# Patient Record
Sex: MALE | Race: WHITE | NOT HISPANIC OR LATINO | Employment: OTHER | URBAN - METROPOLITAN AREA
[De-identification: names, ages, dates, MRNs, and addresses within clinical notes are randomized per-mention and may not be internally consistent; named-entity substitution may affect disease eponyms.]

---

## 2017-05-24 ENCOUNTER — ALLSCRIPTS OFFICE VISIT (OUTPATIENT)
Dept: WOUND CARE | Facility: HOSPITAL | Age: 62
End: 2017-05-24
Payer: COMMERCIAL

## 2017-05-24 DIAGNOSIS — I10 ESSENTIAL (PRIMARY) HYPERTENSION: ICD-10-CM

## 2017-05-24 PROCEDURE — 99213 OFFICE O/P EST LOW 20 MIN: CPT | Performed by: PREVENTIVE MEDICINE

## 2017-06-08 ENCOUNTER — HOSPITAL ENCOUNTER (OUTPATIENT)
Dept: NON INVASIVE DIAGNOSTICS | Facility: HOSPITAL | Age: 62
Discharge: HOME | End: 2017-06-08
Attending: PREVENTIVE MEDICINE
Payer: COMMERCIAL

## 2017-06-08 ENCOUNTER — TRANSCRIBE ORDERS (OUTPATIENT)
Dept: ADMINISTRATIVE | Facility: HOSPITAL | Age: 62
End: 2017-06-08

## 2017-06-08 DIAGNOSIS — I10 ESSENTIAL HYPERTENSION, MALIGNANT: Primary | ICD-10-CM

## 2017-06-08 DIAGNOSIS — I10 ESSENTIAL HYPERTENSION, MALIGNANT: ICD-10-CM

## 2017-06-08 LAB
ATRIAL RATE: 62 BPM
P AXIS: 25 DEGREES
PR INTERVAL: 184 MS
QRS AXIS: 4 DEGREES
QRSD INTERVAL: 108 MS
QT INTERVAL: 400 MS
QTC INTERVAL: 406 MS
T WAVE AXIS: 18 DEGREES
VENTRICULAR RATE: 62 BPM

## 2017-06-08 PROCEDURE — 93005 ELECTROCARDIOGRAM TRACING: CPT

## 2017-06-15 ENCOUNTER — APPOINTMENT (OUTPATIENT)
Dept: WOUND CARE | Facility: HOSPITAL | Age: 62
End: 2017-06-15
Payer: COMMERCIAL

## 2017-06-15 PROCEDURE — G0277 HBOT, FULL BODY CHAMBER, 30M: HCPCS | Performed by: NURSE PRACTITIONER

## 2017-06-16 ENCOUNTER — APPOINTMENT (OUTPATIENT)
Dept: WOUND CARE | Facility: HOSPITAL | Age: 62
End: 2017-06-16
Payer: COMMERCIAL

## 2017-06-16 PROCEDURE — G0277 HBOT, FULL BODY CHAMBER, 30M: HCPCS | Performed by: NURSE PRACTITIONER

## 2017-06-19 ENCOUNTER — APPOINTMENT (OUTPATIENT)
Dept: WOUND CARE | Facility: HOSPITAL | Age: 62
End: 2017-06-19
Payer: COMMERCIAL

## 2017-06-19 PROCEDURE — G0277 HBOT, FULL BODY CHAMBER, 30M: HCPCS | Performed by: SURGERY

## 2017-06-20 ENCOUNTER — APPOINTMENT (OUTPATIENT)
Dept: WOUND CARE | Facility: HOSPITAL | Age: 62
End: 2017-06-20
Payer: COMMERCIAL

## 2017-06-20 PROCEDURE — G0277 HBOT, FULL BODY CHAMBER, 30M: HCPCS | Performed by: NURSE PRACTITIONER

## 2017-06-21 ENCOUNTER — APPOINTMENT (OUTPATIENT)
Dept: WOUND CARE | Facility: HOSPITAL | Age: 62
End: 2017-06-21
Payer: COMMERCIAL

## 2017-06-21 PROCEDURE — G0277 HBOT, FULL BODY CHAMBER, 30M: HCPCS | Performed by: PREVENTIVE MEDICINE

## 2017-06-22 ENCOUNTER — APPOINTMENT (OUTPATIENT)
Dept: WOUND CARE | Facility: HOSPITAL | Age: 62
End: 2017-06-22
Payer: COMMERCIAL

## 2017-06-22 PROCEDURE — G0277 HBOT, FULL BODY CHAMBER, 30M: HCPCS | Performed by: NURSE PRACTITIONER

## 2017-06-23 ENCOUNTER — APPOINTMENT (OUTPATIENT)
Dept: WOUND CARE | Facility: HOSPITAL | Age: 62
End: 2017-06-23
Payer: COMMERCIAL

## 2017-06-23 PROCEDURE — G0277 HBOT, FULL BODY CHAMBER, 30M: HCPCS

## 2017-06-26 ENCOUNTER — APPOINTMENT (OUTPATIENT)
Dept: WOUND CARE | Facility: HOSPITAL | Age: 62
End: 2017-06-26
Payer: COMMERCIAL

## 2017-06-26 PROCEDURE — G0277 HBOT, FULL BODY CHAMBER, 30M: HCPCS | Performed by: SURGERY

## 2017-06-27 ENCOUNTER — APPOINTMENT (OUTPATIENT)
Dept: WOUND CARE | Facility: HOSPITAL | Age: 62
End: 2017-06-27
Payer: COMMERCIAL

## 2017-06-27 PROCEDURE — G0277 HBOT, FULL BODY CHAMBER, 30M: HCPCS | Performed by: NURSE PRACTITIONER

## 2017-06-28 ENCOUNTER — APPOINTMENT (OUTPATIENT)
Dept: WOUND CARE | Facility: HOSPITAL | Age: 62
End: 2017-06-28
Payer: COMMERCIAL

## 2017-06-28 PROCEDURE — G0277 HBOT, FULL BODY CHAMBER, 30M: HCPCS | Performed by: PREVENTIVE MEDICINE

## 2017-06-29 ENCOUNTER — APPOINTMENT (OUTPATIENT)
Dept: WOUND CARE | Facility: HOSPITAL | Age: 62
End: 2017-06-29
Payer: COMMERCIAL

## 2017-06-29 PROCEDURE — G0277 HBOT, FULL BODY CHAMBER, 30M: HCPCS | Performed by: PREVENTIVE MEDICINE

## 2017-06-30 ENCOUNTER — APPOINTMENT (OUTPATIENT)
Dept: WOUND CARE | Facility: HOSPITAL | Age: 62
End: 2017-06-30
Payer: COMMERCIAL

## 2017-06-30 PROCEDURE — G0277 HBOT, FULL BODY CHAMBER, 30M: HCPCS | Performed by: SURGERY

## 2017-07-03 ENCOUNTER — APPOINTMENT (OUTPATIENT)
Dept: WOUND CARE | Facility: HOSPITAL | Age: 62
End: 2017-07-03
Payer: COMMERCIAL

## 2017-07-03 PROCEDURE — G0277 HBOT, FULL BODY CHAMBER, 30M: HCPCS | Performed by: SURGERY

## 2017-07-05 ENCOUNTER — APPOINTMENT (OUTPATIENT)
Dept: WOUND CARE | Facility: HOSPITAL | Age: 62
End: 2017-07-05
Payer: COMMERCIAL

## 2017-07-05 PROCEDURE — G0277 HBOT, FULL BODY CHAMBER, 30M: HCPCS

## 2017-07-06 ENCOUNTER — APPOINTMENT (OUTPATIENT)
Dept: WOUND CARE | Facility: HOSPITAL | Age: 62
End: 2017-07-06
Payer: COMMERCIAL

## 2017-07-06 PROCEDURE — G0277 HBOT, FULL BODY CHAMBER, 30M: HCPCS

## 2017-07-11 ENCOUNTER — APPOINTMENT (OUTPATIENT)
Dept: WOUND CARE | Facility: HOSPITAL | Age: 62
End: 2017-07-11
Payer: COMMERCIAL

## 2017-07-11 PROCEDURE — G0277 HBOT, FULL BODY CHAMBER, 30M: HCPCS

## 2017-07-12 ENCOUNTER — APPOINTMENT (OUTPATIENT)
Dept: WOUND CARE | Facility: HOSPITAL | Age: 62
End: 2017-07-12
Payer: COMMERCIAL

## 2017-07-12 PROCEDURE — G0277 HBOT, FULL BODY CHAMBER, 30M: HCPCS

## 2017-07-13 ENCOUNTER — APPOINTMENT (OUTPATIENT)
Dept: WOUND CARE | Facility: HOSPITAL | Age: 62
End: 2017-07-13
Payer: COMMERCIAL

## 2017-07-13 PROCEDURE — G0277 HBOT, FULL BODY CHAMBER, 30M: HCPCS

## 2017-07-14 ENCOUNTER — APPOINTMENT (OUTPATIENT)
Dept: WOUND CARE | Facility: HOSPITAL | Age: 62
End: 2017-07-14
Payer: COMMERCIAL

## 2017-07-14 PROCEDURE — G0277 HBOT, FULL BODY CHAMBER, 30M: HCPCS | Performed by: SURGERY

## 2017-07-17 ENCOUNTER — APPOINTMENT (OUTPATIENT)
Dept: WOUND CARE | Facility: HOSPITAL | Age: 62
End: 2017-07-17
Payer: COMMERCIAL

## 2017-07-17 PROCEDURE — G0277 HBOT, FULL BODY CHAMBER, 30M: HCPCS | Performed by: SURGERY

## 2017-07-24 ENCOUNTER — APPOINTMENT (OUTPATIENT)
Dept: WOUND CARE | Facility: HOSPITAL | Age: 62
End: 2017-07-24
Payer: COMMERCIAL

## 2017-07-24 PROCEDURE — G0277 HBOT, FULL BODY CHAMBER, 30M: HCPCS | Performed by: SURGERY

## 2017-07-25 ENCOUNTER — APPOINTMENT (OUTPATIENT)
Dept: WOUND CARE | Facility: HOSPITAL | Age: 62
End: 2017-07-25
Payer: COMMERCIAL

## 2017-07-25 PROCEDURE — G0277 HBOT, FULL BODY CHAMBER, 30M: HCPCS | Performed by: PREVENTIVE MEDICINE

## 2017-07-26 ENCOUNTER — APPOINTMENT (OUTPATIENT)
Dept: WOUND CARE | Facility: HOSPITAL | Age: 62
End: 2017-07-26
Payer: COMMERCIAL

## 2017-07-26 PROCEDURE — G0277 HBOT, FULL BODY CHAMBER, 30M: HCPCS | Performed by: PREVENTIVE MEDICINE

## 2017-07-28 ENCOUNTER — APPOINTMENT (OUTPATIENT)
Dept: WOUND CARE | Facility: HOSPITAL | Age: 62
End: 2017-07-28
Payer: COMMERCIAL

## 2017-07-28 PROCEDURE — G0277 HBOT, FULL BODY CHAMBER, 30M: HCPCS | Performed by: SURGERY

## 2017-07-31 ENCOUNTER — APPOINTMENT (OUTPATIENT)
Dept: WOUND CARE | Facility: HOSPITAL | Age: 62
End: 2017-07-31
Payer: COMMERCIAL

## 2017-07-31 PROCEDURE — G0277 HBOT, FULL BODY CHAMBER, 30M: HCPCS | Performed by: SURGERY

## 2017-08-01 ENCOUNTER — APPOINTMENT (OUTPATIENT)
Dept: WOUND CARE | Facility: HOSPITAL | Age: 62
End: 2017-08-01
Payer: COMMERCIAL

## 2017-08-01 PROCEDURE — G0277 HBOT, FULL BODY CHAMBER, 30M: HCPCS | Performed by: PREVENTIVE MEDICINE

## 2017-08-02 ENCOUNTER — APPOINTMENT (OUTPATIENT)
Dept: WOUND CARE | Facility: HOSPITAL | Age: 62
End: 2017-08-02
Payer: COMMERCIAL

## 2017-08-02 PROCEDURE — G0277 HBOT, FULL BODY CHAMBER, 30M: HCPCS | Performed by: PREVENTIVE MEDICINE

## 2017-08-03 ENCOUNTER — APPOINTMENT (OUTPATIENT)
Dept: WOUND CARE | Facility: HOSPITAL | Age: 62
End: 2017-08-03
Payer: COMMERCIAL

## 2017-08-03 PROCEDURE — G0277 HBOT, FULL BODY CHAMBER, 30M: HCPCS | Performed by: PREVENTIVE MEDICINE

## 2017-08-04 ENCOUNTER — APPOINTMENT (OUTPATIENT)
Dept: WOUND CARE | Facility: HOSPITAL | Age: 62
End: 2017-08-04
Payer: COMMERCIAL

## 2017-08-04 PROCEDURE — G0277 HBOT, FULL BODY CHAMBER, 30M: HCPCS | Performed by: SURGERY

## 2017-08-07 ENCOUNTER — APPOINTMENT (OUTPATIENT)
Dept: WOUND CARE | Facility: HOSPITAL | Age: 62
End: 2017-08-07
Payer: COMMERCIAL

## 2017-08-07 PROCEDURE — G0277 HBOT, FULL BODY CHAMBER, 30M: HCPCS | Performed by: SURGERY

## 2018-01-15 VITALS
HEIGHT: 72 IN | HEART RATE: 80 BPM | SYSTOLIC BLOOD PRESSURE: 156 MMHG | WEIGHT: 210 LBS | RESPIRATION RATE: 18 BRPM | BODY MASS INDEX: 28.44 KG/M2 | TEMPERATURE: 96.4 F | DIASTOLIC BLOOD PRESSURE: 98 MMHG

## 2018-05-16 ENCOUNTER — APPOINTMENT (OUTPATIENT)
Dept: WOUND CARE | Facility: HOSPITAL | Age: 63
End: 2018-05-16
Payer: COMMERCIAL

## 2018-05-16 PROCEDURE — 99213 OFFICE O/P EST LOW 20 MIN: CPT | Performed by: FAMILY MEDICINE

## 2018-05-17 ENCOUNTER — HOSPITAL ENCOUNTER (OUTPATIENT)
Dept: RADIOLOGY | Facility: HOSPITAL | Age: 63
Discharge: HOME | End: 2018-05-17
Attending: PREVENTIVE MEDICINE
Payer: COMMERCIAL

## 2018-05-17 DIAGNOSIS — I10 ESSENTIAL (PRIMARY) HYPERTENSION: ICD-10-CM

## 2018-05-17 PROCEDURE — 71046 X-RAY EXAM CHEST 2 VIEWS: CPT

## 2018-05-23 ENCOUNTER — APPOINTMENT (OUTPATIENT)
Dept: WOUND CARE | Facility: HOSPITAL | Age: 63
End: 2018-05-23
Payer: COMMERCIAL

## 2018-05-23 PROCEDURE — G0277 HBOT, FULL BODY CHAMBER, 30M: HCPCS | Performed by: FAMILY MEDICINE

## 2018-05-24 ENCOUNTER — APPOINTMENT (OUTPATIENT)
Dept: WOUND CARE | Facility: HOSPITAL | Age: 63
End: 2018-05-24
Payer: COMMERCIAL

## 2018-05-24 PROCEDURE — G0277 HBOT, FULL BODY CHAMBER, 30M: HCPCS | Performed by: FAMILY MEDICINE

## 2018-05-30 ENCOUNTER — APPOINTMENT (OUTPATIENT)
Dept: WOUND CARE | Facility: HOSPITAL | Age: 63
End: 2018-05-30
Payer: COMMERCIAL

## 2018-05-30 PROCEDURE — G0277 HBOT, FULL BODY CHAMBER, 30M: HCPCS | Performed by: FAMILY MEDICINE

## 2018-05-31 ENCOUNTER — APPOINTMENT (OUTPATIENT)
Dept: WOUND CARE | Facility: HOSPITAL | Age: 63
End: 2018-05-31
Payer: COMMERCIAL

## 2018-05-31 PROCEDURE — G0277 HBOT, FULL BODY CHAMBER, 30M: HCPCS | Performed by: FAMILY MEDICINE

## 2018-06-06 ENCOUNTER — APPOINTMENT (OUTPATIENT)
Dept: WOUND CARE | Facility: HOSPITAL | Age: 63
End: 2018-06-06
Payer: COMMERCIAL

## 2018-06-06 PROCEDURE — G0277 HBOT, FULL BODY CHAMBER, 30M: HCPCS | Performed by: PREVENTIVE MEDICINE

## 2018-06-07 ENCOUNTER — APPOINTMENT (OUTPATIENT)
Dept: WOUND CARE | Facility: HOSPITAL | Age: 63
End: 2018-06-07
Payer: COMMERCIAL

## 2018-06-07 PROCEDURE — G0277 HBOT, FULL BODY CHAMBER, 30M: HCPCS | Performed by: FAMILY MEDICINE

## 2018-06-13 ENCOUNTER — APPOINTMENT (OUTPATIENT)
Dept: WOUND CARE | Facility: HOSPITAL | Age: 63
End: 2018-06-13
Payer: COMMERCIAL

## 2018-06-13 PROCEDURE — G0277 HBOT, FULL BODY CHAMBER, 30M: HCPCS | Performed by: FAMILY MEDICINE

## 2018-06-19 ENCOUNTER — APPOINTMENT (OUTPATIENT)
Dept: WOUND CARE | Facility: HOSPITAL | Age: 63
End: 2018-06-19
Payer: COMMERCIAL

## 2018-06-19 PROCEDURE — G0277 HBOT, FULL BODY CHAMBER, 30M: HCPCS | Performed by: PREVENTIVE MEDICINE

## 2018-06-27 ENCOUNTER — APPOINTMENT (OUTPATIENT)
Dept: WOUND CARE | Facility: HOSPITAL | Age: 63
End: 2018-06-27
Payer: COMMERCIAL

## 2018-06-27 PROCEDURE — G0277 HBOT, FULL BODY CHAMBER, 30M: HCPCS | Performed by: FAMILY MEDICINE

## 2018-06-28 ENCOUNTER — APPOINTMENT (OUTPATIENT)
Dept: WOUND CARE | Facility: HOSPITAL | Age: 63
End: 2018-06-28
Payer: COMMERCIAL

## 2018-06-28 PROCEDURE — G0277 HBOT, FULL BODY CHAMBER, 30M: HCPCS | Performed by: FAMILY MEDICINE

## 2018-12-08 ENCOUNTER — HOSPITAL ENCOUNTER (EMERGENCY)
Facility: HOSPITAL | Age: 63
Discharge: HOME | End: 2018-12-09
Attending: EMERGENCY MEDICINE | Admitting: EMERGENCY MEDICINE
Payer: COMMERCIAL

## 2018-12-08 VITALS
DIASTOLIC BLOOD PRESSURE: 103 MMHG | RESPIRATION RATE: 20 BRPM | BODY MASS INDEX: 30.48 KG/M2 | OXYGEN SATURATION: 95 % | WEIGHT: 225 LBS | TEMPERATURE: 97.2 F | HEIGHT: 72 IN | SYSTOLIC BLOOD PRESSURE: 196 MMHG | HEART RATE: 74 BPM

## 2018-12-08 DIAGNOSIS — N30.91 HEMORRHAGIC CYSTITIS: ICD-10-CM

## 2018-12-08 DIAGNOSIS — R31.0 FRANK HEMATURIA: ICD-10-CM

## 2018-12-08 DIAGNOSIS — R33.8 ACUTE URINARY RETENTION: Primary | ICD-10-CM

## 2018-12-08 PROCEDURE — 87086 URINE CULTURE/COLONY COUNT: CPT | Performed by: EMERGENCY MEDICINE

## 2018-12-08 PROCEDURE — 99283 EMERGENCY DEPT VISIT LOW MDM: CPT

## 2018-12-08 PROCEDURE — 81001 URINALYSIS AUTO W/SCOPE: CPT | Performed by: EMERGENCY MEDICINE

## 2018-12-08 RX ORDER — CEPHALEXIN 250 MG/1
500 CAPSULE ORAL ONCE
Status: COMPLETED | OUTPATIENT
Start: 2018-12-09 | End: 2018-12-08

## 2018-12-08 RX ORDER — LEVOTHYROXINE SODIUM 0.05 MG/1
TABLET ORAL DAILY
COMMUNITY
End: 2020-07-29

## 2018-12-08 RX ORDER — LOSARTAN POTASSIUM 100 MG/1
TABLET ORAL DAILY
COMMUNITY
End: 2020-07-29

## 2018-12-08 RX ORDER — LIDOCAINE HYDROCHLORIDE 20 MG/ML
JELLY TOPICAL ONCE
Status: COMPLETED | OUTPATIENT
Start: 2018-12-08 | End: 2018-12-08

## 2018-12-08 RX ADMIN — CEPHALEXIN 500 MG: 250 CAPSULE ORAL at 23:51

## 2018-12-08 RX ADMIN — LIDOCAINE HYDROCHLORIDE: 20 JELLY TOPICAL at 23:12

## 2018-12-09 LAB
BACTERIA UR QL AUTO: ABNORMAL /HPF
BILIRUB UR QL STRIP: NEGATIVE
CLARITY UR: ABNORMAL
COLOR UR: ABNORMAL
GLUCOSE UR STRIP-MCNC: NEGATIVE MG/DL
HGB UR QL STRIP.AUTO: ABNORMAL
KETONES UR STRIP-MCNC: ABNORMAL MG/DL
LEUKOCYTE ESTERASE UR QL STRIP: ABNORMAL
MUCOUS THREADS UR QL AUTO: ABNORMAL
NITRITE UR QL STRIP: POSITIVE
NON-SQ EPI CELLS URNS QL MICRO: ABNORMAL /HPF
PH UR STRIP.AUTO: 7 [PH] (ref 4.5–8)
PROT UR STRIP-MCNC: >=300 MG/DL
RBC #/AREA URNS AUTO: ABNORMAL /HPF
SP GR UR STRIP.AUTO: 1.02 (ref 1–1.03)
UROBILINOGEN UR QL STRIP.AUTO: 4 E.U./DL
WBC #/AREA URNS AUTO: ABNORMAL /HPF

## 2018-12-09 RX ORDER — CEPHALEXIN 500 MG/1
500 CAPSULE ORAL EVERY 12 HOURS SCHEDULED
Qty: 20 CAPSULE | Refills: 0 | Status: SHIPPED | OUTPATIENT
Start: 2018-12-09 | End: 2018-12-19

## 2018-12-09 NOTE — ED PROVIDER NOTES
History  Chief Complaint   Patient presents with    Urinary Retention     patient presents to the ED with c/o urinary retention and blood in urine starting at 1600  Patient states he has a procedure done one mopnth ago on his prostate  58year old male with history of BPH and recent cystoscopy with Holmium laser enucleation of prostate on 11/8/18 with Dr Yodit Styles of [facility] presents for evaluation of gross hematuria and acute urinary retention beginning at 4 pm this afternoon  Patient states that he had hematuria following his procedure that resolved after several days and had been feeling well until symptoms began this afternoon  He noted clots in the end of the urinary stream and has not been able to empty his bladder since attempting to urinate at 4 pm  He denies any fevers, chills, nausea or vomiting  No recent dysuria  He reports suprapubic discomfort, but denies significant pain  No back pain  Patient had undergone urolift procedure on 4/20/18  History provided by:  Patient  Blood in Urine   This is a new problem  The current episode started today  The problem has been rapidly worsening since onset  He describes the hematuria as gross hematuria  The hematuria occurs during the initial portion of his urinary stream  He reports clotting at the end of his urine stream  The pain is mild  He describes his urine color as dark red  Irritative symptoms do not include frequency  Obstructive symptoms include dribbling, incomplete emptying and an intermittent stream  Associated symptoms include abdominal pain (mild, suprapubic discomfort/fullness)  Pertinent negatives include no dysuria, fever, nausea or vomiting  His past medical history is significant for BPH  (Cystoscopy 1 month ago)       Prior to Admission Medications   Prescriptions Last Dose Informant Patient Reported?  Taking?   levothyroxine 50 mcg tablet   Yes Yes   Sig: Take by mouth daily     losartan (COZAAR) 100 MG tablet   Yes Yes   Sig: Take by mouth daily        Facility-Administered Medications: None       Past Medical History:   Diagnosis Date    BPH (benign prostatic hyperplasia)     Cancer (HCC)     throat    Disease of thyroid gland     Hypertension        History reviewed  No pertinent surgical history  History reviewed  No pertinent family history  I have reviewed and agree with the history as documented  Social History   Substance Use Topics    Smoking status: Never Smoker    Smokeless tobacco: Never Used    Alcohol use Yes      Comment: daily         Review of Systems   Constitutional: Negative for appetite change, diaphoresis, fatigue and fever  HENT: Negative for congestion, rhinorrhea and sore throat  Respiratory: Negative for cough, chest tightness and shortness of breath  Cardiovascular: Negative for chest pain, palpitations and leg swelling  Gastrointestinal: Positive for abdominal pain (mild, suprapubic discomfort/fullness)  Negative for constipation, diarrhea, nausea and vomiting  Genitourinary: Positive for hematuria and incomplete emptying  Negative for difficulty urinating, dysuria and frequency  Musculoskeletal: Negative for myalgias, neck pain and neck stiffness  Skin: Negative for pallor  Neurological: Negative for syncope, weakness and headaches  All other systems reviewed and are negative  Physical Exam  Physical Exam   Constitutional: He appears well-developed and well-nourished  Non-toxic appearance  No distress  HENT:   Head: Normocephalic and atraumatic  Eyes: Pupils are equal, round, and reactive to light  EOM are normal    Neck: Normal range of motion  No tracheal deviation present  No thyromegaly present  Cardiovascular: Normal rate, regular rhythm, normal heart sounds and intact distal pulses  Pulmonary/Chest: Effort normal and breath sounds normal    Abdominal: Soft  Bowel sounds are normal  He exhibits distension (with palpable bladder)  There is no tenderness  Lymphadenopathy:     He has no cervical adenopathy  Skin: Skin is warm and dry  He is not diaphoretic  Nursing note and vitals reviewed  Vital Signs  ED Triage Vitals [12/08/18 2304]   Temperature Pulse Respirations Blood Pressure SpO2   (!) 97 2 °F (36 2 °C) 74 20 (!) 196/103 95 %      Temp Source Heart Rate Source Patient Position - Orthostatic VS BP Location FiO2 (%)   Temporal Monitor Sitting Right arm --      Pain Score       5           Vitals:    12/08/18 2304 12/08/18 2315   BP: (!) 196/103 (!) 196/103   Pulse: 74    Patient Position - Orthostatic VS: Sitting        Visual Acuity      ED Medications  Medications   lidocaine (URO-JET) 2 % topical gel ( Topical Given 12/8/18 2312)   cephalexin (KEFLEX) capsule 500 mg (500 mg Oral Given 12/8/18 2351)       Diagnostic Studies  Results Reviewed     Procedure Component Value Units Date/Time    Urine culture [[id number]]     Lab Status:  No result Specimen:  Urine     UA w Reflex to Microscopic w Reflex to Culture [[id number]]  (Abnormal) Collected:  12/08/18 2321    Lab Status:  Edited Result - FINAL Specimen:  Urine from Urine, Indwelling Billingsley Catheter Updated:  12/09/18 0006     Color, UA Red     Clarity, UA Cloudy     Specific [place], UA 1 020     pH, UA 7 0     Leukocytes, UA Moderate (A)     Nitrite, UA Positive (A)     Protein, UA >=300 (A) mg/dl      Glucose, UA Negative mg/dl      Ketones, UA 15 (1+) (A) mg/dl      Urobilinogen, UA 4 0 (A) E U /dl      Bilirubin, UA Negative     Blood, UA Large (A)    Narrative:       Possible false positives due to Large amount of RBC's in this specimen  Corrected report called to SERA Esqueda by [facility])    Urine Microscopic [[id number]]  (Abnormal) Collected:  12/08/18 2321    Lab Status:  Final result Specimen:  Urine from Urine, Indwelling Billingsley Catheter Updated:  12/09/18 0000     RBC, UA Innumerable (A) /hpf      WBC, UA 0-1 (A) /hpf      Epithelial Cells None Seen /hpf      Bacteria, UA None Seen /hpf      MUCUS THREADS None Seen                 No orders to display              Procedures  Procedures       Phone Contacts  ED Phone Contact    ED Course  ED Course as of Dec 09 0040   Sat Dec 08, 2018   2348 Hemorrhagic cystitis vs false positive from gross hematuria Nitrite, UA: (!) Positive                               MDM  Number of Diagnoses or Management Options  Acute urinary retention: new and requires workup  Gus  hematuria: new and requires workup  Hemorrhagic cystitis: new and requires workup  Diagnosis management comments: 58year old male with history of BPH and recent cystoscopy presents with gross hematuria and acute urinary retention  Billingsley catheter placed and bladder flushed at bedside by nursing  UA shows possible UTI with positive nitrates; however, micro negative for significant leukocytes or bacteria  Possible false positive  Will treat pending culture results  Urology follow up  Discussed return precautions with patient         Amount and/or Complexity of Data Reviewed  Clinical lab tests: ordered and reviewed    Patient Progress  Patient progress: stable    CritCare Time    Disposition  Final diagnoses:   Acute urinary retention   Gus  hematuria   Hemorrhagic cystitis     Time reflects when diagnosis was documented in both MDM as applicable and the Disposition within this note     Time User Action Codes Description Comment    12/8/2018 11:13 PM [place] Lo Add [R33 8] Acute urinary retention     12/8/2018 11:13 PM [place] Add [R31 0] Gus  hematuria     12/8/2018 11:49 PM Christine Barrios Add [N30 91] Hemorrhagic cystitis     12/8/2018 11:49 PM [place] Lo Modify [R33 8] Acute urinary retention     12/8/2018 11:49 PM [place] SHEIKH Modify [N30 91] Hemorrhagic cystitis     12/9/2018 12:09 AM [place] Lo Modify [R33 8] Acute urinary retention     12/9/2018 12:09 AM [place] Lo Modify [N30 91] Hemorrhagic cystitis       ED Disposition ED Disposition Condition Comment    Discharge  Lars Blair discharge to home/self care  Condition at discharge: Stable        Follow-up Information     Follow up With Specialties Details Why Contact Info Additional Information    Laure Page MD  Schedule an appointment as soon as possible for a visit in 3 days for re-evaluation [address] , [address] [place] [address] [phone]       [facility] Emergency Department Emergency Medicine Go to If symptoms worsen, output from york catheter stops, weakness, lightheadedness or loss of consciousness [address] [place] [address] [address] ED, [place] [address], [place], [place], [address]          Patient's Medications   Discharge Prescriptions    CEPHALEXIN (KEFLEX) 500 MG CAPSULE    Take 1 capsule (500 mg total) by mouth every 12 (twelve) hours for 10 days       Start Date: 12/9/2018 End Date: 12/19/2018       Order Dose: 500 mg       Quantity: 20 capsule    Refills: 0     No discharge procedures on file      ED Provider  Electronically Signed by           Sakshi Walker MD  12/09/18 0040

## 2018-12-09 NOTE — ED NOTES
Pt irrigated with 1200 L of fluid, 1200 returned  Urine is a light, clear red       Christian Malone RN  12/09/18 4869

## 2018-12-09 NOTE — DISCHARGE INSTRUCTIONS
Urinary Retention in Men   WHAT YOU NEED TO KNOW:   Urinary retention is a condition that develops when your bladder does not empty completely when you urinate  DISCHARGE INSTRUCTIONS:   Medicines:   · Medicines  can help decrease the size of your prostate, fight infection, and help you urinate more easily  · Take your medicine as directed  Contact your healthcare provider if you think your medicine is not helping or if you have side effects  Tell him or her if you are allergic to any medicine  Keep a list of the medicines, vitamins, and herbs you take  Include the amounts, and when and why you take them  Bring the list or the pill bottles to follow-up visits  Carry your medicine list with you in case of an emergency  Billingsley catheter care: You may need a Billingsley catheter for up to 2 weeks at home  Healthcare providers will give you a smaller leg bag to collect urine  Keep the bag below your waist  This will prevent urine from flowing back into your bladder and causing an infection or other problems  Also, keep the tube free of kinks so the urine will drain properly  Do not pull on the catheter  This can cause pain and bleeding, and may cause the catheter to come out  Ask your healthcare provider or urologist for more information on Billingsley catheter care  Urinate regularly:  When your catheter is removed, do not let your bladder become too full before you urinate  Set regular times each day to urinate  Urinate as soon as you feel the need or at least every 3 hours while you are awake  Do not drink liquids before you go to bed  Urinate right before you go to bed  Follow up with your healthcare provider or urologist as directed:  Write down your questions so you remember to ask them during your visits  Contact your healthcare provider or urologist if:   · You have a fever  · You have pain when you urinate  · You have blood in your urine  · You have problems with your catheter      · You have questions or concerns about your condition or care  Return to the emergency department if:   · You have severe abdominal pain  · You are breathing faster than usual     · Your heartbeat is faster than usual     · Your face, hands, feet, or ankles are swollen  © 2017 [address] [employer] Information is for End User's use only and may not be sold, redistributed or otherwise used for commercial purposes  All illustrations and images included in CareNotes® are the copyrighted property of A D A M , Inc  or Elvin Persaud  The above information is an  only  It is not intended as medical advice for individual conditions or treatments  Talk to your doctor, nurse or pharmacist before following any medical regimen to see if it is safe and effective for you  Hematuria   WHAT YOU NEED TO KNOW:   Hematuria is blood in your urine  Your urine may be bright red to dark brown  DISCHARGE INSTRUCTIONS:   Return to the emergency department if:     · You are urinating very small amounts or not at all  · You feel like you cannot empty your bladder  · You have severe back or side pain that does not go away with treatment  Contact your healthcare provider if:   · You have a fever that gets worse or does not go away with treatment  · You cannot keep liquids or medicines down  · Your urine gets darker, even after you drink extra liquids  · You have questions or concerns about your condition, treatment, or care  Drink liquids as directed: You may need to drink extra liquids to help flush the blood from your body through your urine  Water is the best liquid to drink  Ask how much liquid to drink each day and which liquids are best for you  Follow up with your healthcare provider as directed:  Write down your questions so you remember to ask them during your visits     © 2017 [address] [employer] Information is for End User's use only and may not be sold, redistributed or otherwise used for commercial purposes  All illustrations and images included in CareNotes® are the copyrighted property of A [employer] M [employer]  or Elvin Persaud  The above information is an  only  It is not intended as medical advice for individual conditions or treatments  Talk to your doctor, nurse or pharmacist before following any medical regimen to see if it is safe and effective for you

## 2018-12-09 NOTE — ED NOTES
Bladder irrigation performed  800mL of sterile water put into bladder with 900mL returned  Blood returned was cherry red in color  Original return prior to irrigation was clotted and dark red in color        Daniel Treviño RN  12/09/18 9821

## 2018-12-10 LAB — BACTERIA UR CULT: NORMAL

## 2019-05-30 ENCOUNTER — CONSULT (OUTPATIENT)
Dept: CARDIOLOGY CLINIC | Facility: CLINIC | Age: 64
End: 2019-05-30
Payer: COMMERCIAL

## 2019-05-30 VITALS
SYSTOLIC BLOOD PRESSURE: 138 MMHG | DIASTOLIC BLOOD PRESSURE: 80 MMHG | HEIGHT: 72 IN | HEART RATE: 77 BPM | WEIGHT: 223.3 LBS | BODY MASS INDEX: 30.25 KG/M2

## 2019-05-30 DIAGNOSIS — I10 HYPERTENSION, UNSPECIFIED TYPE: ICD-10-CM

## 2019-05-30 DIAGNOSIS — R53.83 FATIGUE, UNSPECIFIED TYPE: Primary | ICD-10-CM

## 2019-05-30 PROCEDURE — 93000 ELECTROCARDIOGRAM COMPLETE: CPT | Performed by: INTERNAL MEDICINE

## 2019-05-30 PROCEDURE — 99243 OFF/OP CNSLTJ NEW/EST LOW 30: CPT | Performed by: INTERNAL MEDICINE

## 2019-05-30 RX ORDER — IMIQUIMOD 12.5 MG/.25G
CREAM TOPICAL
COMMUNITY
Start: 2017-10-03 | End: 2020-07-29

## 2019-05-30 RX ORDER — TADALAFIL 5 MG/1
5 TABLET ORAL AS NEEDED
COMMUNITY
Start: 2015-07-09

## 2019-05-30 RX ORDER — ZOLPIDEM TARTRATE 10 MG/1
10 TABLET ORAL
COMMUNITY
Start: 2015-06-20

## 2019-06-14 ENCOUNTER — TELEPHONE (OUTPATIENT)
Dept: CARDIOLOGY CLINIC | Facility: CLINIC | Age: 64
End: 2019-06-14

## 2020-01-28 ENCOUNTER — TELEPHONE (OUTPATIENT)
Dept: UROLOGY | Facility: MEDICAL CENTER | Age: 65
End: 2020-01-28

## 2020-01-28 NOTE — TELEPHONE ENCOUNTER
Reason for appointment/Complaint/Diagnosis : urinary frequency    Insurance: Medicare     History of Cancer? yes                       If yes, what kind? throat    Previous urologist?     Dr Myah Alonso, [facility] at [address] [facility]     Records requested/where? Already in [address] [place] [address] Rd    Outside testing/where? No    Location Preference for office visit?  [place]

## 2020-02-10 ENCOUNTER — OFFICE VISIT (OUTPATIENT)
Dept: UROLOGY | Facility: CLINIC | Age: 65
End: 2020-02-10
Payer: COMMERCIAL

## 2020-02-10 ENCOUNTER — TELEPHONE (OUTPATIENT)
Dept: UROLOGY | Facility: CLINIC | Age: 65
End: 2020-02-10

## 2020-02-10 VITALS
SYSTOLIC BLOOD PRESSURE: 126 MMHG | DIASTOLIC BLOOD PRESSURE: 70 MMHG | HEART RATE: 80 BPM | BODY MASS INDEX: 31.29 KG/M2 | HEIGHT: 72 IN | WEIGHT: 231 LBS

## 2020-02-10 DIAGNOSIS — R35.0 URINARY FREQUENCY: Primary | ICD-10-CM

## 2020-02-10 LAB
SL AMB  POCT GLUCOSE, UA: NORMAL
SL AMB LEUKOCYTE ESTERASE,UA: NORMAL
SL AMB POCT BILIRUBIN,UA: NORMAL
SL AMB POCT BLOOD,UA: NORMAL
SL AMB POCT CLARITY,UA: CLEAR
SL AMB POCT COLOR,UA: YELLOW
SL AMB POCT KETONES,UA: NORMAL
SL AMB POCT NITRITE,UA: NORMAL
SL AMB POCT PH,UA: 6
SL AMB POCT SPECIFIC GRAVITY,UA: 1.01
SL AMB POCT URINE PROTEIN: NORMAL
SL AMB POCT UROBILINOGEN: NORMAL

## 2020-02-10 PROCEDURE — 99242 OFF/OP CONSLTJ NEW/EST SF 20: CPT | Performed by: UROLOGY

## 2020-02-10 PROCEDURE — 81002 URINALYSIS NONAUTO W/O SCOPE: CPT | Performed by: UROLOGY

## 2020-02-10 NOTE — TELEPHONE ENCOUNTER
Return in about 2 weeks (around 2/24/2020) for urodynamics/cmg then ov for discussion, also do voiding log

## 2020-02-10 NOTE — TELEPHONE ENCOUNTER
Patient managed by Dr Sanchez Sylvester with c/o urinary frequency and urgency with h/o urinary retention prior to laser enucleation of the prostate  Currently on Myrbetriq  Called and left a message for patient to call back to discuss testing and scheduling  Reviewed first available date would be 2/20 at the [facility] office  Alternatively, available 2/26/20 at the [address] [place] [address] office  Awaiting call back

## 2020-02-10 NOTE — PROGRESS NOTES
Progress Note - Urology  Marianna Fairchild 71 y o  male MRN: [id number]  Encounter: [id number]      Chief Complaint:   Chief Complaint   Patient presents with    Urinary Frequency       HPI:  76-year-old male who presents for evaluation of management of urinary urgency and frequency  Apparently he has been managed by Urology at [facility]  He initially approximately spring of 2018  He then developed further urinary retention and underwent a laser enucleation of the prostate  Biopsies of the prostate at that time were benign  Last PSA recorded his 2015 was 1 5  His major complaint is that of urgency and frequency going between 30 minutes and 2 hours  He does feel his volume is may be 8-10 oz  He has had residual urines which were negligible  I cannot find any evidence of urodynamic studies  He has no urinary incontinence  He has no dysuria or hematuria   He has been on Myrbetriq and is unsure whether this is helpful      MEDS:    Current Outpatient Medications:     imiquimod (ALDARA) 5 % cream, APPLY TO AFFECTED AREAS 2-3 TIMES A WEEK FOR 8 WEEKS, Disp: , Rfl:     levothyroxine 50 mcg tablet, Take by mouth daily  , Disp: , Rfl:     losartan (COZAAR) 100 MG tablet, Take by mouth daily  , Disp: , Rfl:     Mirabegron ER 50 MG TB24, Take 50 mg by mouth daily, Disp: , Rfl:     tadalafil (CIALIS) 5 MG tablet, Take 5 mg by mouth as needed , Disp: , Rfl:     zolpidem (AMBIEN) 10 mg tablet, Take 10 mg by mouth daily at bedtime as needed , Disp: , Rfl:       PMH:  Past Medical History:   Diagnosis Date    BPH (benign prostatic hyperplasia)     Cancer (HCC)     throat    Disease of thyroid gland     Hypertension          PSH  Past Surgical History:   Procedure Laterality Date    KNEE SURGERY  01/23/2020    replacement          FH  Family History   Problem Relation Age of Onset    Heart disease Mother         SH  Social History     Socioeconomic History    Marital status: /Civil Union Spouse name: None    Number of children: None    Years of education: None    Highest education level: None   Occupational History    None   Social Needs    Financial resource strain: None    Food insecurity:     Worry: None     Inability: None    Transportation needs:     Medical: None     Non-medical: None   Tobacco Use    Smoking status: Never Smoker    Smokeless tobacco: Never Used   Substance and Sexual Activity    Alcohol use: Yes     Comment: daily     Drug use: No    Sexual activity: None   Lifestyle    Physical activity:     Days per week: None     Minutes per session: None    Stress: None   Relationships    Social connections:     Talks on phone: None     Gets together: None     Attends [employer]ism service: None     Active member of club or organization: None     Attends meetings of clubs or organizations: None     Relationship status: None    Intimate partner violence:     Fear of current or ex partner: None     Emotionally abused: None     Physically abused: None     Forced sexual activity: None   Other Topics Concern    None   Social History Narrative    None          ROS:  Review of Systems      Vitals:  Blood pressure 126/70, pulse 80, height 6' (1 829 m), weight 105 kg (231 lb)  Physical Exam:     General Appearance    Well-developed male in no obvious distress  He had recently had knee surgery on January 23rd  I did not do a physical examination at this time  I reviewed his data if from [place] and from [facility]  I reviewed this with the patient and his wife in the exam room  I stated that he will need a urology examination after we get more data with urodynamic studies           Lab, Imaging and other studies:  Recent Results (from the past 672 hour(s))   POCT urine dip    Collection Time: 02/10/20  8:48 AM   Result Value Ref Range    LEUKOCYTE ESTERASE,UA ++     NITRITE,UA -     SL AMB POCT UROBILINOGEN -     POCT URINE PROTEIN -      PH,UA 6     BLOOD,UA - SPECIFIC GRAVITY,UA 1 010     KETONES,UA -     BILIRUBIN,UA -     GLUCOSE, UA -      COLOR,UA yellow     CLARITY,UA clear            IMPRESSION:    1  Voiding dysfunction   2  BPH    PLAN:   I will proceed with urodynamic studies and a voiding log  After review of these and his examination we will determine if he is a candidate for further urologic intervention  This could be either Botox or InterStim trial   I did review both of these with the patient and his wife  More detail will follow after we determine his voiding history  He has been on Myrbetriq with minimal relief      Please note :  Voice dictation software has been used to create this document  There may be inadvertent transcription errors

## 2020-02-17 NOTE — TELEPHONE ENCOUNTER
Patients girlfriend Chan Sears ( on med comm consent ) calling back in regards to scheduling  She can be reached at [phone]

## 2020-02-17 NOTE — TELEPHONE ENCOUNTER
Called and spoke with girlfriend  Briefly reviewed urodynamic testing and what to expect  Encouraged to arrive with a comfortably full bladder, avoid caffeine the day of testing and optional enema 2 hours prior to testing  Scheduled 3/5/2020 at the [facility], this is about 6 weeks post knee surgery  FU scheduled with Dr Na Mccracken at the [facility] office 3/12/2020 at 0830  She confirmed appt details

## 2020-03-02 ENCOUNTER — TELEPHONE (OUTPATIENT)
Dept: UROLOGY | Facility: AMBULATORY SURGERY CENTER | Age: 65
End: 2020-03-02

## 2020-03-02 NOTE — TELEPHONE ENCOUNTER
Patient managed by Deepika Serra is calling to cancel and reschedule for 3/5/20 with nurse    Please call back [phone] for rescheduling

## 2020-03-02 NOTE — TELEPHONE ENCOUNTER
Called and spoke with patient's wife  Rescheduled to 3/11/2020 at the [facility] office at 2pm   Address and directions provided  He can keep review appt with Dr Erick Kaminski as scheduled 3/12/19

## 2020-03-11 ENCOUNTER — PROCEDURE VISIT (OUTPATIENT)
Dept: UROLOGY | Facility: AMBULATORY SURGERY CENTER | Age: 65
End: 2020-03-11
Payer: MEDICARE

## 2020-03-11 DIAGNOSIS — R39.15 URGENCY OF URINATION: ICD-10-CM

## 2020-03-11 DIAGNOSIS — R35.0 URINARY FREQUENCY: Primary | ICD-10-CM

## 2020-03-11 LAB
SL AMB  POCT GLUCOSE, UA: NEGATIVE
SL AMB LEUKOCYTE ESTERASE,UA: NEGATIVE
SL AMB POCT BILIRUBIN,UA: NEGATIVE
SL AMB POCT BLOOD,UA: NEGATIVE
SL AMB POCT CLARITY,UA: CLEAR
SL AMB POCT COLOR,UA: YELLOW
SL AMB POCT KETONES,UA: NEGATIVE
SL AMB POCT NITRITE,UA: NEGATIVE
SL AMB POCT PH,UA: 5
SL AMB POCT SPECIFIC GRAVITY,UA: 1.01
SL AMB POCT URINE PROTEIN: NEGATIVE
SL AMB POCT UROBILINOGEN: NORMAL

## 2020-03-11 PROCEDURE — 81002 URINALYSIS NONAUTO W/O SCOPE: CPT

## 2020-03-11 PROCEDURE — 51784 ANAL/URINARY MUSCLE STUDY: CPT

## 2020-03-11 PROCEDURE — 51726 COMPLEX CYSTOMETROGRAM: CPT

## 2020-03-11 NOTE — PROGRESS NOTES
CC:"urgency and frequency to urinate"    Uroflow:       Voided volume:       21 6 ml       Max flow rate:          3 3  ml/sec       Average flow rate:    3 9 ml/sec       PVR:    40 ml       Patient felt volume was less than normal:     CMG:       Position:  sitting / standing       Malcolm sensation:     76 ml,     pdet -6 cm of H2O       First urge:        137 ml,     pdet 1  cm of H2O        Normal urge:     209 ml,    pdet1  cm of H2O           Must urge:         221 ml,    pdet -1  cm of H2O           Capacity:          332  ml,    pdet 1  cm of H2O    Stopped fill due to c/o pain         Max pdet during void:     Patient unable to void with catheters in place, only able to void in bathroom       Voided volume:    325 ml         Bladder stability:   stable            Compliance:  normal        EMG activity:       Normal during filling,       abnormal during void attempt    Comments:   Sensory urgency with stable bladder   + EMG activity during void attempts   Stopped fill at 332 ml due to c/o pain   Patient unable to void with catheter in place, stated it felt like something was being "pinched off"  Did void in bathroom after all catheters removed       Urodynamics  Date/Time: 3/11/2020 4:47 PM  Performed by: Nilda Bronson RN  Authorized by: Zoe Gardiner MD   Procedure - Urodynamics:  Procedure details: CMG    Voiding Pressure Study: No      Post-procedure:     Patient tolerance: Patient tolerated procedure well with no immediate complications

## 2020-03-17 ENCOUNTER — TELEPHONE (OUTPATIENT)
Dept: UROLOGY | Facility: MEDICAL CENTER | Age: 65
End: 2020-03-17

## 2020-03-17 NOTE — TELEPHONE ENCOUNTER
Attempted to call patient at this time  No answer, left message per medical communication  advising patient his appointment on 3/19/2020 has been canceled

## 2020-03-17 NOTE — TELEPHONE ENCOUNTER
Patient spouse called back to confirm cancellation for 3/19/2020  Expecting call from Dr Elizabeth Linda   Please call patient at [phone]

## 2020-03-17 NOTE — TELEPHONE ENCOUNTER
This is a patient of Dr Nita Avila and patient received a phone call that they were canceling patients appointment on Thursday and patient is fine with that  Dr Nita Avila noted he will call the patient on Thursday for his results  Patient would like to know around the time the doctor plans on calling so they are prepared  Please leave a message on their phone to give an estimated time for the phone call at [phone]

## 2020-03-17 NOTE — TELEPHONE ENCOUNTER
Left a message letting patient know that I am unsure of an exact time that Dr Genia Calderon would be reaching out to him, but that it will be sometime in the morning as Dr Genia Calderon does not work in the afternoon  Stated that it most likely will be earlier in the morning then later  If patient has any further questions they were instructed to call the office back main office phone number was provided  At this time appt for Thursday 3/19 was cancelled

## 2020-03-19 ENCOUNTER — TELEMEDICINE (OUTPATIENT)
Dept: UROLOGY | Facility: CLINIC | Age: 65
End: 2020-03-19
Payer: MEDICARE

## 2020-03-19 DIAGNOSIS — N39.8 VOIDING DYSFUNCTION: ICD-10-CM

## 2020-03-19 DIAGNOSIS — R35.0 BENIGN PROSTATIC HYPERPLASIA WITH URINARY FREQUENCY: ICD-10-CM

## 2020-03-19 DIAGNOSIS — N40.1 BENIGN PROSTATIC HYPERPLASIA WITH URINARY FREQUENCY: ICD-10-CM

## 2020-03-19 DIAGNOSIS — R35.0 FREQUENCY OF URINATION: Primary | ICD-10-CM

## 2020-03-19 PROCEDURE — 99442 PR PHYS/QHP TELEPHONE EVALUATION 11-20 MIN: CPT | Performed by: UROLOGY

## 2020-03-19 NOTE — PROGRESS NOTES
Virtual Brief Visit    Reason for visit is  Follow-up voiding dysfunction and review of urodynamic studies    This virtual check-in was done via telephone  Encounter provider Delgado Hernandez MD    Provider located at [address] [place] [address] [facility] [address]      Recent Visits  No visits were found meeting these conditions  Showing recent visits within past 7 days and meeting all other requirements     Future Appointments  No visits were found meeting these conditions  Showing future appointments within next 150 days and meeting all other requirements        Patient agrees to participate in a virtual check in via telephone or video visit instead of presenting to the office to address urgent/immediate medical needs  Patient is aware this is a billable service  After connecting through telephone, the patient was identified by name and date of birth  Gabriela Haile was informed that this was a telemedicine visit and that the exam was being conducted confidentially over secure lines  My office door was closed  No one else was in the room  He acknowledged consent and understanding of privacy and security of the virtual check-in visit  I informed the patient that I have reviewed his record in Epic and presented the opportunity for him to ask any questions regarding the visit today  The patient initiated communication and agreed to participate  Pooja Li is a 71 y o  male  With a somewhat complicated urologic history  He was initially seen in 2017 at [facility] for voiding symptoms  Christina Covington He then was seen by [facility] in [place]  After evaluation, he had a Urolift procedure in April of 2018  Following this he had no relief a symptomatology and had a laser vaporization(HoLep) of the prostate in November of 2018   Following that procedure he was seen in the emergency room at [facility] for retention and bleeding  He had a Billingsley catheter for short time  This was then removed at [facility]  He was able to void at that time but still continued to have frequency  He has been on Myrbetriq 50 mg daily with minimal relief  His symptoms primarily are that of frequency and urgency sensation  He has no incontinence  He may void from 20 minutes but can be as long as 3 hours which is not the norm  His averages between 1 in 2 hours  This is worse when laying down  He has not been able to attribute any contributing circumstances to his voiding pattern  There is no dysuria  No hematuria  The urodynamic studies included a flow study which was inadequate because of a small volume voided  He had a previous flow study at [facility] showing 16 mL/second with 55 mL residual urine on a volume of 146 mL voided  He did not yet do a voiding log which had been requested  I then reviewed the uroflow study as noted previously his urodynamic study was then performed which did not show any detrusor instability  It did show significant sphincteric activity when asked to void  His volume did approach 330 ml  At which time he was unable to void  I suggested that we give began behavior modification  Suggested that when he has sensation to void he should attempt to hold his urination for additional 20 minutes or longer if comfortable  Continue to take the Myrbetriq  Most importantly, I would review a voiding log  He will attempt to email this to the office  I also may wish to review a cystoscopic evaluation on this gentleman        Past Medical History:   Diagnosis Date    BPH (benign prostatic hyperplasia)     Cancer ([address] )     throat    Disease of thyroid gland     Hypertension        Past Surgical History:   Procedure Laterality Date    KNEE SURGERY  01/23/2020    replacement        Current Outpatient Medications   Medication Sig Dispense Refill    imiquimod (ALDARA) 5 % cream APPLY TO AFFECTED AREAS 2-3 TIMES A WEEK FOR 8 WEEKS      levothyroxine 50 mcg tablet Take by mouth daily        losartan (COZAAR) 100 MG tablet Take by mouth daily        Mirabegron ER 50 MG TB24 Take 50 mg by mouth daily      tadalafil (CIALIS) 5 MG tablet Take 5 mg by mouth as needed       zolpidem (AMBIEN) 10 mg tablet Take 10 mg by mouth daily at bedtime as needed        No current facility-administered medications for this visit  Allergies   Allergen Reactions    Penicillins Rash       Assessment     1  Frequency of urination   2  Voiding dysfunction   3  Prostatic hyperplasia with lower tract symptoms   Disposition:      As noted above he will complete a voiding log  He will attempt behavior modification  He will continue the Myrbetriq 50 mg daily  We will schedule cystoscopy after review of the voiding log  I spent 20 minutes with the patient during this virtual check-in visit

## 2020-04-13 ENCOUNTER — TELEMEDICINE (OUTPATIENT)
Dept: UROLOGY | Facility: CLINIC | Age: 65
End: 2020-04-13
Payer: MEDICARE

## 2020-04-13 DIAGNOSIS — R35.0 FREQUENCY OF URINATION: ICD-10-CM

## 2020-04-13 DIAGNOSIS — N32.81 OAB (OVERACTIVE BLADDER): Primary | ICD-10-CM

## 2020-04-13 PROCEDURE — 99213 OFFICE O/P EST LOW 20 MIN: CPT | Performed by: UROLOGY

## 2020-07-09 ENCOUNTER — APPOINTMENT (OUTPATIENT)
Dept: WOUND CARE | Facility: HOSPITAL | Age: 65
End: 2020-07-09
Payer: COMMERCIAL

## 2020-07-09 DIAGNOSIS — I10 ESSENTIAL HYPERTENSION: Primary | ICD-10-CM

## 2020-07-09 PROCEDURE — 99213 OFFICE O/P EST LOW 20 MIN: CPT

## 2020-07-23 ENCOUNTER — APPOINTMENT (OUTPATIENT)
Dept: WOUND CARE | Facility: HOSPITAL | Age: 65
End: 2020-07-23
Payer: COMMERCIAL

## 2020-07-23 PROCEDURE — 99213 OFFICE O/P EST LOW 20 MIN: CPT

## 2020-07-27 ENCOUNTER — APPOINTMENT (OUTPATIENT)
Dept: WOUND CARE | Facility: HOSPITAL | Age: 65
End: 2020-07-27
Payer: COMMERCIAL

## 2020-07-27 PROCEDURE — G0277 HBOT, FULL BODY CHAMBER, 30M: HCPCS

## 2020-07-28 ENCOUNTER — APPOINTMENT (OUTPATIENT)
Dept: WOUND CARE | Facility: HOSPITAL | Age: 65
End: 2020-07-28
Payer: COMMERCIAL

## 2020-07-28 PROCEDURE — G0277 HBOT, FULL BODY CHAMBER, 30M: HCPCS

## 2020-07-29 ENCOUNTER — APPOINTMENT (OUTPATIENT)
Dept: WOUND CARE | Facility: HOSPITAL | Age: 65
End: 2020-07-29
Payer: COMMERCIAL

## 2020-07-29 ENCOUNTER — HOSPITAL ENCOUNTER (EMERGENCY)
Facility: HOSPITAL | Age: 65
Discharge: HOME | End: 2020-07-29
Attending: EMERGENCY MEDICINE | Admitting: EMERGENCY MEDICINE
Payer: COMMERCIAL

## 2020-07-29 ENCOUNTER — APPOINTMENT (EMERGENCY)
Dept: RADIOLOGY | Facility: HOSPITAL | Age: 65
End: 2020-07-29
Payer: COMMERCIAL

## 2020-07-29 VITALS
DIASTOLIC BLOOD PRESSURE: 90 MMHG | WEIGHT: 196.21 LBS | SYSTOLIC BLOOD PRESSURE: 189 MMHG | RESPIRATION RATE: 18 BRPM | TEMPERATURE: 97.6 F | BODY MASS INDEX: 26.61 KG/M2 | HEART RATE: 75 BPM | OXYGEN SATURATION: 99 %

## 2020-07-29 DIAGNOSIS — T82.898A OCCLUSION OF PERIPHERALLY INSERTED CENTRAL CATHETER (PICC) LINE, INITIAL ENCOUNTER (HCC): ICD-10-CM

## 2020-07-29 DIAGNOSIS — Z45.2 ENCOUNTER FOR ASSESSMENT OF PERIPHERALLY INSERTED CENTRAL VENOUS CATHETER (PICC): Primary | ICD-10-CM

## 2020-07-29 PROCEDURE — 99284 EMERGENCY DEPT VISIT MOD MDM: CPT | Performed by: EMERGENCY MEDICINE

## 2020-07-29 PROCEDURE — 99283 EMERGENCY DEPT VISIT LOW MDM: CPT

## 2020-07-29 PROCEDURE — G0277 HBOT, FULL BODY CHAMBER, 30M: HCPCS

## 2020-07-29 PROCEDURE — 71045 X-RAY EXAM CHEST 1 VIEW: CPT

## 2020-07-29 RX ORDER — LORATADINE 10 MG
81 TABLET ORAL DAILY
COMMUNITY
Start: 2020-07-21

## 2020-07-29 RX ORDER — SULFAMETHOXAZOLE AND TRIMETHOPRIM 800; 160 MG/1; MG/1
TABLET ORAL
COMMUNITY
Start: 2020-06-26

## 2020-07-29 RX ORDER — METRONIDAZOLE 500 MG/1
TABLET ORAL
COMMUNITY
Start: 2020-07-07

## 2020-07-29 RX ORDER — CEPHALEXIN 500 MG/1
CAPSULE ORAL
COMMUNITY
Start: 2020-07-07

## 2020-07-29 RX ORDER — LOSARTAN POTASSIUM 50 MG/1
50 TABLET ORAL 2 TIMES DAILY
COMMUNITY
Start: 2020-05-04

## 2020-07-29 RX ORDER — CIPROFLOXACIN 500 MG/1
TABLET, FILM COATED ORAL
COMMUNITY
Start: 2020-06-30

## 2020-07-29 RX ORDER — LEVOTHYROXINE SODIUM 75 UG/1
75 CAPSULE ORAL DAILY
COMMUNITY
Start: 2020-07-23

## 2020-07-29 RX ORDER — HYDROCODONE BITARTRATE AND ACETAMINOPHEN 7.5; 325 MG/1; MG/1
TABLET ORAL
COMMUNITY
Start: 2020-05-06

## 2020-07-29 RX ORDER — ATORVASTATIN CALCIUM 80 MG/1
TABLET, FILM COATED ORAL
COMMUNITY
Start: 2020-07-21

## 2020-07-29 RX ORDER — CLINDAMYCIN HYDROCHLORIDE 300 MG/1
CAPSULE ORAL
COMMUNITY
Start: 2020-06-30

## 2020-07-29 RX ORDER — MIDODRINE HYDROCHLORIDE 2.5 MG/1
2.5 TABLET ORAL 2 TIMES DAILY
COMMUNITY
Start: 2020-07-21

## 2020-07-29 RX ADMIN — ALTEPLASE 2 MG: 2.2 INJECTION, POWDER, LYOPHILIZED, FOR SOLUTION INTRAVENOUS at 18:02

## 2020-07-29 NOTE — ED PROVIDER NOTES
History  Chief Complaint   Patient presents with    PICC Line Problem     pt reports picc line wont flush starting today  pt reports it happens from time to time and picc team has to fix it  picc placed approx 2 weeks ago      76-year-old male who had a PICC line placed 2 weeks ago at [facility] for a dental infection requiring continuous unasyn fusion, for 1 more month, presented to the ED with his wife who states they have been having a hard time flushing it after his hyperbaric treatment earlier today  Jayce Collins says he only time she removes the IV infusion and she did the heparin flush as she was supposed to but now does not function  Patient denies any pain at the site or drainage noted or pain anywhere else  Prior to Admission Medications   Prescriptions Last Dose Informant Patient Reported? Taking?    [employer] ASPIRIN ADULT LOW DOSE 81 MG chewable tablet   Yes Yes   Sig: Chew 81 mg daily   HYDROcodone-acetaminophen (NORCO) 7 5-325 mg per tablet   Yes Yes   Sig: TAKE 1 TABLET BY MOUTH EVERY 4 6 HOURS AS NEEDED FOR PAIN   Mirabegron ER 50 MG TB24  Self Yes Yes   Sig: Take 50 mg by mouth daily   TIROSINT 75 MCG CAPS   Yes Yes   Sig: Take 75 mcg by mouth daily    ampicillin-sulbactam 3 g in sodium chloride 0 9 % 100 mL IVPB   Yes Yes   Sig: Infuse 3 g into a venous catheter every 6 (six) hours   atorvastatin (LIPITOR) 80 mg tablet   Yes Yes   Sig: TAKE 1 TABLET BY MOUTH EVERY DAY AT NIGHT   cephalexin (KEFLEX) 500 mg capsule   Yes No   Sig: TAKE 1 CAPSULE (500 MG) 4 TIMES PER DAY UNTIL GONE   ciprofloxacin (CIPRO) 500 mg tablet   Yes No   Sig: TAKE 1 TABLET (500 MG) BY MOUTH TWICE A DAY UNTIL ALL ARE GONE   clindamycin (CLEOCIN) 300 MG capsule   Yes No   Sig: TAKE 1 CAPSULE (300 MG) BY MOUTH 4 TIMES A DAY UNTIL ALL ARE GONE   losartan (COZAAR) 50 mg tablet   Yes Yes   Sig: Take 50 mg by mouth 2 (two) times a day   metroNIDAZOLE (FLAGYL) 500 mg tablet   Yes No   Sig: TAKE 1 TABLET (500 MG) 4 TIMES PER DAY UNTIL GONE   midodrine (PROAMATINE) 2 5 mg tablet   Yes Yes   Sig: Take 2 5 mg by mouth 2 (two) times a day   sulfamethoxazole-trimethoprim (BACTRIM DS) 800-160 mg per tablet   Yes No   Sig: TAKE 1 TABLET TWICE A DAY UNTIL FINISHED   tadalafil (CIALIS) 5 MG tablet  Self Yes Yes   Sig: Take 5 mg by mouth as needed    zolpidem (AMBIEN) 10 mg tablet  Self Yes Yes   Sig: Take 10 mg by mouth daily at bedtime as needed       Facility-Administered Medications: None       Past Medical History:   Diagnosis Date    BPH (benign prostatic hyperplasia)     Cancer (HCC)     throat/tonsils, lymph nodes (head,face,neck)    Disease of thyroid gland     Hypertension     [address] ([address]A) [address] [address]        Past Surgical History:   Procedure Laterality Date    HERNIA REPAIR      KNEE SURGERY  01/23/2020    replacement     LYMPH NODE DISSECTION      NECK MASS EXCISION         Family History   Problem Relation Age of Onset    Heart disease Mother      I have reviewed and agree with the history as documented  E-Cigarette/Vaping     E-Cigarette/Vaping Substances     Social History     Tobacco Use    Smoking status: Never Smoker    Smokeless tobacco: Never Used   Substance Use Topics    Alcohol use: Yes     Comment: daily     Drug use: No        Review of Systems   All other systems reviewed and are negative  Physical Exam  ED Triage Vitals [07/29/20 1655]   Temperature Pulse Respirations Blood Pressure SpO2   97 6 °F (36 4 °C) 75 18 (!) 189/90 99 %      Temp Source Heart Rate Source Patient Position - Orthostatic VS BP Location FiO2 (%)   Temporal -- -- -- --      Pain Score       --             Orthostatic Vital Signs  Vitals:    07/29/20 1655   BP: (!) 189/90   Pulse: 75       Physical Exam  General: VS reviewed  Appears in NAD  awake, alert  Well-nourished, well-developed  Appears stated age  Speaking normally in full sentences  Head: Normocephalic, atraumatic  Eyes: EOM-I  No diplopia     No hyphema  No subconjunctival hemorrhages  Symmetrical lids  ENT: Atraumatic external nose and ears  MMM  No malocclusion  No stridor  Normal phonation  No drooling  Normal swallowing  Neck: No JVD  CV: No pallor noted  Lungs:   No tachypnea  No respiratory distress  MSK:   FROM spontaneously  PICC line noted in RUE with no erythema/induration/fluctuance/drainage noted  Skin: Dry, intact  Neuro: Awake, alert, GCS15, CN II-XII grossly intact  Motor grossly intact  Psychiatric/Behavioral: Appropriate mood and affect   Exam: deferred      ED Medications  Medications   alteplase (CATHFLO) injection 2 mg (2 mg Intracatheter Given 7/29/20 1802)       Diagnostic Studies  Results Reviewed     None                 XR chest PICC line portable   Final Result by Justine Brady MD (07/29 1804)      No acute cardiopulmonary disease  Workstation performed: [id number]               Procedures  Procedures      ED Course  ED Course as of Jul 29 1846   Wed Jul 29, 2020   1844 PICC line functioning at this time                                              MDM  Number of Diagnoses or Management Options  Diagnosis management comments: Patient presenting with PICC line problem  Will get an x-ray of the PICC line  Attempt to flush with cath flow          Disposition  Final diagnoses:   Encounter for assessment of peripherally inserted central venous catheter (PICC)   Occlusion of peripherally inserted central catheter (PICC) line, initial encounter [facility])     Time reflects when diagnosis was documented in both MDM as applicable and the Disposition within this note     Time User Action Codes Description Comment    7/29/2020  6:45 PM Larry Vang Add [Z45 2] Encounter for assessment of peripherally inserted central venous catheter (PICC)     7/29/2020  6:45 PM Larry Vang Add [T82 898A] Occlusion of peripherally inserted central catheter (PICC) line, initial encounter [facility])       ED Disposition ED Disposition Condition Date/Time Comment    Discharge Stable Wed Jul 29, 2020  6:45 PM Trisha Okeefe discharge to home/self care  Follow-up Information     Follow up With Specialties Details Why Contact Info Additional Information    Wilber Francisco  Go to   [facility] [address] Emergency Department Emergency Medicine Go to  As needed, If symptoms worsen [place] [id number]  [phone] [place] ED, [address]  , [place], [place], [address]          Patient's Medications   Discharge Prescriptions    No medications on file     No discharge procedures on file  PDMP Review     None           ED Provider  Attending physically available and evaluated Trisha Okeefe  I managed the patient along with the ED Attending      Electronically Signed by         Hood Lopez DO  07/29/20 4915

## 2020-07-29 NOTE — ED NOTES
Alteplase removed from PICC line, 5 mL of blood discarded  Line flushed with saline       Jeff Marshall RN  07/29/20 9907

## 2020-07-29 NOTE — ED NOTES
No hub present on ending of PICC line, new luer lock hub placed on end of line       Imelda Chavira RN  07/29/20 Juan Maradiaga

## 2020-08-04 ENCOUNTER — APPOINTMENT (OUTPATIENT)
Dept: WOUND CARE | Facility: HOSPITAL | Age: 65
End: 2020-08-04
Payer: COMMERCIAL

## 2020-08-04 PROCEDURE — G0277 HBOT, FULL BODY CHAMBER, 30M: HCPCS

## 2020-08-05 ENCOUNTER — APPOINTMENT (OUTPATIENT)
Dept: WOUND CARE | Facility: HOSPITAL | Age: 65
End: 2020-08-05
Payer: COMMERCIAL

## 2020-08-05 PROCEDURE — G0277 HBOT, FULL BODY CHAMBER, 30M: HCPCS

## 2020-08-06 ENCOUNTER — APPOINTMENT (OUTPATIENT)
Dept: WOUND CARE | Facility: HOSPITAL | Age: 65
End: 2020-08-06
Payer: COMMERCIAL

## 2020-08-06 PROCEDURE — G0277 HBOT, FULL BODY CHAMBER, 30M: HCPCS

## 2020-08-07 ENCOUNTER — APPOINTMENT (OUTPATIENT)
Dept: WOUND CARE | Facility: HOSPITAL | Age: 65
End: 2020-08-07
Payer: COMMERCIAL

## 2020-08-07 PROCEDURE — G0277 HBOT, FULL BODY CHAMBER, 30M: HCPCS

## 2020-08-10 ENCOUNTER — OFFICE VISIT (OUTPATIENT)
Dept: WOUND CARE | Facility: HOSPITAL | Age: 65
End: 2020-08-10
Payer: COMMERCIAL

## 2020-08-10 DIAGNOSIS — M27.2 INFLAMMATORY CONDITION OF JAW: Primary | ICD-10-CM

## 2020-08-10 PROCEDURE — G0277 HBOT, FULL BODY CHAMBER, 30M: HCPCS

## 2020-08-11 ENCOUNTER — OFFICE VISIT (OUTPATIENT)
Dept: WOUND CARE | Facility: HOSPITAL | Age: 65
End: 2020-08-11
Payer: COMMERCIAL

## 2020-08-11 DIAGNOSIS — M27.2 INFLAMMATORY CONDITION OF JAW: Primary | ICD-10-CM

## 2020-08-11 PROCEDURE — 99183 HYPERBARIC OXYGEN THERAPY: CPT | Performed by: FAMILY MEDICINE

## 2020-08-11 PROCEDURE — G0277 HBOT, FULL BODY CHAMBER, 30M: HCPCS | Performed by: FAMILY MEDICINE

## 2020-08-11 NOTE — PROGRESS NOTES
HBO Treatment Course Details       Treatment Notes:  HBO supervised  tolerated well  No complaints  Treatment Course Number: 10  Total Treatments Ordered:  30     Ordering Physician:  Neno     Diagnosis:   1  Inflammatory condition of jaw       Code: m27 2     HBO Treatment Details:  In-Patient Visit: no  Treatment Length:90 Minutes(Minutes)  Chamber #:       Treatment Details:  ALIA Rate: 2 5  Started Compression: 1400  Reached Compression: 1415  Total Compression Time: 15 (Minutes)  Total Holding Time: 100 (Minutes)  Started Decompression: 1555  Reached Surface: 1415  Total Decompression Time: 10 (Minutes)  Total Airbreaks: 100 (Minutes)  Total Time of Treatment: 120 (Minutes)  Symptoms Noted During Treatment: None (Minutes)                         Vital Signs:  HBO Glucose Reference Range: 100-350 mg/dl   Pre-Treatment Post-Treatment   Time vitals are taken: 1355 Time vitals are taken: 1606   Blood Pressure: 124/80 Blood Pressure: 128/84   Pulse: 72 Pulse: 64   Resp: 18 Resp: 16   Temp: 96 5 °F (35 8 °C) Temp: 96 °F (35 6 °C)                 Allergies   Allergen Reactions    Penicillins Rash     Patient Active Problem List    Diagnosis Date Noted    Inflammatory condition of jaw 08/11/2020    Fatigue 05/30/2019    Hypertension 05/30/2019     No orders of the defined types were placed in this encounter    Compatible tolerance to HBO

## 2020-08-12 ENCOUNTER — OFFICE VISIT (OUTPATIENT)
Dept: WOUND CARE | Facility: HOSPITAL | Age: 65
End: 2020-08-12
Payer: COMMERCIAL

## 2020-08-12 DIAGNOSIS — M27.2 INFLAMMATORY CONDITION OF JAW: Primary | ICD-10-CM

## 2020-08-12 PROCEDURE — G0277 HBOT, FULL BODY CHAMBER, 30M: HCPCS | Performed by: NURSE PRACTITIONER

## 2020-08-12 PROCEDURE — 99183 HYPERBARIC OXYGEN THERAPY: CPT | Performed by: NURSE PRACTITIONER

## 2020-08-12 NOTE — PROGRESS NOTES
HBO Treatment Course Details       Treatment Notes: Patient tolerated treatment well     Treatment Course Number: 11  Total Treatments Ordered:  27     Ordering Physician: Dr Elizabeth Boswell    Diagnosis: No diagnosis found  HBO Treatment Details:  In-Patient Visit: No  Treatment Length:90 Minutes(Minutes)  Chamber #:      Pre-Treatment details:  Pre-treatment protocol Treatment Protocol: 2 5 ALIA X 90 minutes w/ 100% oxygen, two 5 minute air breaks  Left ear clear?: yes  Right ear clear?: yes  Left ear intact?: yes  Right ear intact?: yes                    Left ear TEED scale: Grade 0  Right ear TEED scale: Grade 0   Pretreatment heart and lung assessment: Pretreatment heart and lung auscltation unremarkable  Patient cleared for HBOT     Treatment details:  ALIA Rate: 2 5  Started Compression: 1335  Reached Compression: 1350  Total Compression Time: 15 (Minutes)  Total Holding Time: 100 (Minutes)  Started Decompression: 1530  Reached Surface: 1350  Total Decompression Time: 10 (Minutes)  Total Airbreaks: 100 (Minutes)  Total Time of Treatment: 120 (Minutes)  Symptoms Noted During Treatment: None (Minutes)    Post treatment details:  Left ear clear?: yes  Right ear clear?: yes  Left ears intact?: yes  Right ears intact?: yes                    Left ear TEED scale: Grade 0  Right ear TEED scale: Grade 0  Post treatment heart and lung assessment: Post treatment heart and lung auscltation unremarkable  Patient cleared for discahrge  Tolerated treatment well         Vital Signs:  HBO Glucose Reference Range: 100-350 mg/dl   Pre-Treatment Post-Treatment   Time vitals are taken: 1330 Time vitals are taken: 1541   Blood Pressure: 114/80 Blood Pressure: 120/80   Pulse: 84 Pulse: 68   Resp: 16 Resp: 16   Temp: 96 3 °F (35 7 °C) Temp: 96 5 °F (35 8 °C)             Allergies   Allergen Reactions    Penicillins Rash     Patient Active Problem List    Diagnosis Date Noted    Inflammatory condition of jaw 08/11/2020    Fatigue 05/30/2019    Hypertension 05/30/2019     No orders of the defined types were placed in this encounter    Compatible tolerance to HBO

## 2020-08-12 NOTE — PROGRESS NOTES
HBO Treatment Course Details       Treatment Notes: Patient tolerated treatment well     Treatment Course Number: 11  Total Treatments Ordered:  30     Diagnosis:   1  Inflammatory condition of jaw         HBO Treatment Details:  In-Patient Visit:  NO   Treatment Length:90 Minutes(Minutes)  Chamber #:      Pre-Treatment details:  Pre-treatment protocol Treatment Protocol: 2 5 ALIA X 90 minutes w/ 100% oxygen, two 5 minute air breaks  Left ear clear?: yes  Right ear clear?: yes  Left ear intact?: yes  Right ear intact?: yes                    Left ear TEED scale: Grade 0  Right ear TEED scale: Grade 0   Pretreatment heart and lung assessment: Pretreatment heart and lung auscltation unremarkable  Patient cleared for HBOT     Treatment details:  ALIA Rate: 2 5  Started Compression: 1335  Reached Compression: 1350  Total Compression Time: 15 (Minutes)  Total Holding Time: 100 (Minutes)  Started Decompression: 1530  Reached Surface: 1350  Total Decompression Time: 10 (Minutes)  Total Airbreaks: 100 (Minutes)  Total Time of Treatment: 120 (Minutes)  Symptoms Noted During Treatment: None (Minutes)    Post treatment details:  Left ear clear?: yes  Right ear clear?: yes  Left ears intact?: yes  Right ears intact?: yes                    Left ear TEED scale: Grade 0  Right ear TEED scale: Grade 0  Post treatment heart and lung assessment: Post treatment heart and lung auscltation unremarkable  Patient cleared for discahrge  Tolerated treatment well         Vital Signs:  HBO Glucose Reference Range: 100-350 mg/dl   Pre-Treatment Post-Treatment   Time vitals are taken: 1330 Time vitals are taken: 1541   Blood Pressure: 114/80 Blood Pressure: 120/80   Pulse: 84 Pulse: 68   Resp: 16 Resp: 16   Temp: 96 3 °F (35 7 °C) Temp: 96 5 °F (35 8 °C)             Allergies   Allergen Reactions    Penicillins Rash     Patient Active Problem List    Diagnosis Date Noted    Inflammatory condition of jaw 08/11/2020    Fatigue 05/30/2019    Hypertension 05/30/2019     No orders of the defined types were placed in this encounter

## 2020-08-13 ENCOUNTER — OFFICE VISIT (OUTPATIENT)
Dept: WOUND CARE | Facility: HOSPITAL | Age: 65
End: 2020-08-13
Payer: COMMERCIAL

## 2020-08-13 DIAGNOSIS — M27.2 INFLAMMATORY CONDITION OF JAW: Primary | ICD-10-CM

## 2020-08-13 PROCEDURE — 99183 HYPERBARIC OXYGEN THERAPY: CPT | Performed by: FAMILY MEDICINE

## 2020-08-13 PROCEDURE — G0277 HBOT, FULL BODY CHAMBER, 30M: HCPCS | Performed by: FAMILY MEDICINE

## 2020-08-13 NOTE — PROGRESS NOTES
HBO Treatment Course Details       Treatment Notes:  HBO supervised  Tolerated well  No complaints  Treatment Course Number: 12  Total Treatments Ordered:  20     Ordering Physician:  Neno  HBO ordered today    Diagnosis:   1  Inflammatory condition of jaw         HBO Treatment Details:  In-Patient Visit: no  Treatment Length:90 Minutes(Minutes)  Chamber #:      Pre-Treatment details:  Pre-treatment protocol Treatment Protocol: 2 5 ALIA X 90 minutes w/ 100% oxygen, two 5 minute air breaks  Left ear clear?: yes  Right ear clear?: yes  Left ear intact?: yes  Right ear intact?: yes  Left ear color: dark                 Left ear TEED scale: Grade I  Right ear TEED scale: Grade 0   Pretreatment heart and lung assessment: Pretreatment heart and lung auscltation unremarkable  Patient cleared for HBOT     Treatment details:  ALIA Rate: 2 5  Started Compression: 0805  Reached Compression: 0820  Total Compression Time: 15 (Minutes)  Total Holding Time: 100 (Minutes)  Started Decompression: 1000  Reached Surface: 0820  Total Decompression Time: 10 (Minutes)  Total Airbreaks: 100 (Minutes)  Total Time of Treatment: 120 (Minutes)  Symptoms Noted During Treatment: None (Minutes)    Post treatment details:  Left ear clear?: yes  Right ear clear?: yes  Left ears intact?: yes  Right ears intact?: yes  Left ear color: Dark                 Left ear TEED scale: Grade I  Right ear TEED scale: Grade 0  Post treatment heart and lung assessment: Post treatment heart and lung auscltation unremarkable  Patient cleared for discahrge  Tolerated treatment well         Vital Signs:  HBO Glucose Reference Range: 100-350 mg/dl   Pre-Treatment Post-Treatment   Time vitals are taken: 0755 Time vitals are taken: 1012   Blood Pressure: 124/80 Blood Pressure: 128/84   Pulse: 72 Pulse: 84   Resp: 18 Resp: 16   Temp: 97 3 °F (36 3 °C) Temp: 96 1 °F (35 6 °C)                 Allergies   Allergen Reactions    Penicillins Rash     Patient Active Problem List    Diagnosis Date Noted    Inflammatory condition of jaw 08/11/2020    Fatigue 05/30/2019    Hypertension 05/30/2019     HBO ordered today

## 2020-08-13 NOTE — PROGRESS NOTES
HBO Treatment Course Details       Treatment Notes:  HBO supervised tolerating well  Treatment Course Number: 12  Total Treatments Ordered:  20     Ordering Physician:  Neno    Diagnosis:   1  Inflammatory condition of jaw         HBO Treatment Details:  In-Patient Visit: no  Treatment Length:90 Minutes(Minutes)  Chamber #:      Pre-Treatment details:  Pre-treatment protocol Treatment Protocol: 2 5 ALIA X 90 minutes w/ 100% oxygen, two 5 minute air breaks  Left ear clear?: yes  Right ear clear?: yes  Left ear intact?: yes  Right ear intact?: yes  Left ear color: dark                 Left ear TEED scale: Grade I  Right ear TEED scale: Grade 0   Pretreatment heart and lung assessment: Pretreatment heart and lung auscltation unremarkable  Patient cleared for HBOT     Treatment details:  ALIA Rate: 2 5  Started Compression: 0805  Reached Compression: 0820  Total Compression Time: 15 (Minutes)  Total Holding Time: 100 (Minutes)  Started Decompression: 1000  Reached Surface: 0820  Total Decompression Time: 10 (Minutes)  Total Airbreaks: 100 (Minutes)  Total Time of Treatment: 120 (Minutes)  Symptoms Noted During Treatment: None (Minutes)    Post treatment details:  Left ear clear?: yes  Right ear clear?: yes  Left ears intact?: yes  Right ears intact?: yes  Left ear color: Dark                 Left ear TEED scale: Grade I  Right ear TEED scale: Grade 0  Post treatment heart and lung assessment: Post treatment heart and lung auscltation unremarkable  Patient cleared for discahrge  Tolerated treatment well         Vital Signs:  HBO Glucose Reference Range: 100-350 mg/dl   Pre-Treatment Post-Treatment   Time vitals are taken: 0755 Time vitals are taken: 1012   Blood Pressure: 124/80 Blood Pressure: 128/84   Pulse: 72 Pulse: 84   Resp: 18 Resp: 16   Temp: 97 3 °F (36 3 °C) Temp: 96 1 °F (35 6 °C)             Additional information:      Allergies   Allergen Reactions    Penicillins Rash     Patient Active Problem List Diagnosis Date Noted    Inflammatory condition of jaw 08/11/2020    Fatigue 05/30/2019    Hypertension 05/30/2019     No orders of the defined types were placed in this encounter    Compatible tolerance to HBO

## 2020-08-14 ENCOUNTER — OFFICE VISIT (OUTPATIENT)
Dept: WOUND CARE | Facility: HOSPITAL | Age: 65
End: 2020-08-14
Payer: COMMERCIAL

## 2020-08-14 DIAGNOSIS — M27.2 INFLAMMATORY CONDITION OF JAW: Primary | ICD-10-CM

## 2020-08-14 PROCEDURE — G0277 HBOT, FULL BODY CHAMBER, 30M: HCPCS | Performed by: FAMILY MEDICINE

## 2020-08-14 PROCEDURE — 99183 HYPERBARIC OXYGEN THERAPY: CPT | Performed by: FAMILY MEDICINE

## 2020-08-14 NOTE — LETTER
August 14, 2020     Patient: Lars Blair   YOB: 1955   Date of Visit: 8/14/2020       To Whom it May Concern:    Denae Guerrero is under my professional care  He was seen in my office on 8/14/2020  He {Return to school/sport/work:[id number]}  If you have any questions or concerns, please don't hesitate to call           Sincerely,          Jv Stroud MD        CC: No Recipients

## 2020-08-14 NOTE — PROGRESS NOTES
HBO Treatment Course Details       Treatment Notes:  HBO supervised  tolerated well  No complaints  Treatment Course Number: 13  Total Treatments Ordered:  20     Ordering Physician:  Neno  HBO ordered today  Diagnosis:   1  Inflammatory condition of jaw       HBO ordered today  HBO Treatment Details:  In-Patient Visit: no  Treatment Length:90 Minutes(Minutes)  Chamber #:      Pre-Treatment details:  Pre-treatment protocol Treatment Protocol: 2 5 ALIA X 90 minutes w/ 100% oxygen, two 5 minute air breaks  Left ear clear?: yes  Right ear clear?: yes  Left ear intact?: yes  Right ear intact?: yes  Left ear color: Dark                 Left ear TEED scale: Grade I  Right ear TEED scale: Grade 0   Pretreatment heart and lung assessment: Pretreatment heart and lung auscltation unremarkable  Patient cleared for HBOT     Treatment details:  ALIA Rate: 2 5  Started Compression: 1330  Reached Compression: 1345  Total Compression Time: 15 (Minutes)  Total Holding Time: 100 (Minutes)  Started Decompression: 1525  Reached Surface: 1345  Total Decompression Time: 10 (Minutes)  Total Airbreaks: 100 (Minutes)  Total Time of Treatment: 120 (Minutes)  Symptoms Noted During Treatment: None (Minutes)    Post treatment details:  Left ear clear?: yes  Right ear clear?: yes  Left ears intact?: yes  Right ears intact?: yes  Left ear color: Reddish gray  Right ear color: Dark              Left ear TEED scale: Grade I  Right ear TEED scale: Grade I  Post treatment heart and lung assessment: Post treatment heart and lung auscltation unremarkable  Patient cleared for discahrge  Tolerated treatment well         Vital Signs:  HBO Glucose Reference Range: 100-350 mg/dl   Pre-Treatment Post-Treatment   Time vitals are taken: 1325 Time vitals are taken: 1537   Blood Pressure: 126/80 Blood Pressure: 120/82   Pulse: 72 Pulse: 64   Resp: 18 Resp: 16   Temp: 96 1 °F (35 6 °C) Temp: 96 3 °F (35 7 °C)                 Allergies   Allergen Reactions  Penicillins Rash     Patient Active Problem List    Diagnosis Date Noted    Inflammatory condition of jaw 08/11/2020    Fatigue 05/30/2019    Hypertension 05/30/2019     No orders of the defined types were placed in this encounter    Compatible tolerance to HBO

## 2020-08-17 ENCOUNTER — OFFICE VISIT (OUTPATIENT)
Dept: WOUND CARE | Facility: HOSPITAL | Age: 65
End: 2020-08-17
Payer: COMMERCIAL

## 2020-08-17 PROCEDURE — G0277 HBOT, FULL BODY CHAMBER, 30M: HCPCS

## 2020-08-17 NOTE — PROGRESS NOTES
RN Case Management Note:  Patient here for HBO treatment  He reports he feels well and is tolerating his treatments without incident  He denies ear, sinus or vision problems  The facial swelling, particularly on the R side is slowly resolving  Patient's gait seems to be improving with physical therapy  His wife reports he was started on Prozac "a couple of weeks ago" and she has "noticed a difference" in his behavior  She reports he is "sleeping better, eating better"  His IV antibiotics continue  Patient reports he has a follow up with oral surgery on 8/19 and with ID on 8/20

## 2020-08-17 NOTE — PROGRESS NOTES
HBO Treatment Course Details       Treatment Notes: The patient denies any fever or respiratory issues since his last treatment  He denies any problems with his ears  The patient tolerated his treatment well today  Treatment Course Number: 14  Total Treatments Ordered:  20     Ordering Physician: ***    Diagnosis: No diagnosis found  HBO Treatment Details:  In-Patient Visit: {YES NO AND TMIZENDGD:28085}  Treatment Length:90 Minutes(Minutes)  Chamber #:      Pre-Treatment details:  Pre-treatment protocol Treatment Protocol: 2 5 ALIA X 90 minutes w/ 100% oxygen, two 5 minute air breaks                                             Treatment details:  ALIA Rate: 2 5  Started Compression: 1332  Reached Compression: 1347  Total Compression Time: 15 (Minutes)  Total Holding Time: 100 (Minutes)  Started Decompression: 1527  Reached Surface: 9145  Total Decompression Time: 10 (Minutes)  Total Airbreaks: 100 (Minutes)  Total Time of Treatment: 120 (Minutes)  Symptoms Noted During Treatment: None (Minutes)    Post treatment details:                                              Vital Signs:  HBO Glucose Reference Range: 100-350 mg/dl   Pre-Treatment Post-Treatment   Time vitals are taken: 1325 Time vitals are taken: 1538   Blood Pressure: 126/70 Blood Pressure: 124/80   Pulse: 68 Pulse: 64   Resp: 18 Resp: 16   Temp: 96 1 °F (35 6 °C) Temp: 96 1 °F (35 6 °C)             Additional information:  Date of last debridement: ***  Type of debridement: ***    Allergies   Allergen Reactions    Penicillins Rash     Patient Active Problem List    Diagnosis Date Noted    Inflammatory condition of jaw 08/11/2020    Fatigue 05/30/2019    Hypertension 05/30/2019     No orders of the defined types were placed in this encounter    Compatible tolerance to HBO

## 2020-08-18 ENCOUNTER — OFFICE VISIT (OUTPATIENT)
Dept: WOUND CARE | Facility: HOSPITAL | Age: 65
End: 2020-08-18
Payer: COMMERCIAL

## 2020-08-18 DIAGNOSIS — M27.2 INFLAMMATORY CONDITION OF JAW: Primary | ICD-10-CM

## 2020-08-18 PROCEDURE — 99183 HYPERBARIC OXYGEN THERAPY: CPT | Performed by: FAMILY MEDICINE

## 2020-08-18 PROCEDURE — G0277 HBOT, FULL BODY CHAMBER, 30M: HCPCS | Performed by: FAMILY MEDICINE

## 2020-08-18 NOTE — PROGRESS NOTES
HBO Treatment Course Details       Treatment Notes:  HBO supervised  Tolerated well  No complaints  Treatment Course Number: 15  Total Treatments Ordered:  20     Ordering Physician:  Neno  HBO ordered today  Diagnosis:   1  Inflammatory condition of jaw         HBO Treatment Details:  In-Patient Visit: no  Treatment Length:90 Minutes(Minutes)  Chamber #:      Pre-Treatment details:  Pre-treatment protocol Treatment Protocol: 2 5 ALIA X 90 minutes w/ 100% oxygen, two 5 minute air breaks  Left ear clear?: yes  Right ear clear?: yes  Left ear intact?: yes  Right ear intact?: yes                    Left ear TEED scale: Grade 0  Right ear TEED scale: Grade 0   Pretreatment heart and lung assessment: Pretreatment heart and lung auscltation unremarkable  Patient cleared for HBOT     Treatment details:  ALIA Rate: 2 5  Started Compression: 2122  Reached Compression: 0839  Total Compression Time: 15 (Minutes)  Total Holding Time: 100 (Minutes)  Started Decompression: 1019  Reached Surface: 0839  Total Decompression Time: 10 (Minutes)  Total Airbreaks: 100 (Minutes)  Total Time of Treatment: 120 (Minutes)  Symptoms Noted During Treatment: None (Minutes)    Post treatment details:  Left ear clear?: yes  Right ear clear?: yes  Left ears intact?: yes  Right ears intact?: yes                    Left ear TEED scale: Grade 0  Right ear TEED scale: Grade 0  Post treatment heart and lung assessment: Post treatment heart and lung auscltation unremarkable  Patient cleared for discahrge  Tolerated treatment well         Vital Signs:  HBO Glucose Reference Range: 100-350 mg/dl   Pre-Treatment Post-Treatment   Time vitals are taken: 0820 Time vitals are taken: 1035   Blood Pressure: 126/84 Blood Pressure: 136/86   Pulse: 60 Pulse: 72   Resp: 18 Resp: 16   Temp: 96 1 °F (35 6 °C) Temp: 96 2 °F (35 7 °C)                 Allergies   Allergen Reactions    Penicillins Rash     Patient Active Problem List    Diagnosis Date Noted    Inflammatory condition of jaw 08/11/2020    Fatigue 05/30/2019    Hypertension 05/30/2019     No orders of the defined types were placed in this encounter    Compatible tolerance to HBO

## 2020-08-21 ENCOUNTER — OFFICE VISIT (OUTPATIENT)
Dept: WOUND CARE | Facility: HOSPITAL | Age: 65
End: 2020-08-21
Payer: COMMERCIAL

## 2020-08-21 DIAGNOSIS — M27.2 INFLAMMATORY CONDITION OF JAW: Primary | ICD-10-CM

## 2020-08-21 PROCEDURE — 99183 HYPERBARIC OXYGEN THERAPY: CPT | Performed by: FAMILY MEDICINE

## 2020-08-21 PROCEDURE — G0277 HBOT, FULL BODY CHAMBER, 30M: HCPCS | Performed by: FAMILY MEDICINE

## 2020-08-21 NOTE — PROGRESS NOTES
HBO Treatment Course Details       Treatment Notes:  HBO supervised  Tolerated well  No complaints  Treatment Course Number: 16  Total Treatments Ordered:  30     Ordering Physician:  Neno  HBO order today  Diagnosis: No diagnosis found  HBO Treatment Details:  In-Patient Visit: no  Treatment Length:90 Minutes(Minutes)  Chamber #: Hard sided Monoplace Chamber    Pre-Treatment details:  Pre-treatment protocol Treatment Protocol: 2 5 ALIA X 90 minutes w/ 100% oxygen, two 5 minute air breaks  Left ear clear?: yes  Right ear clear?: yes  Left ear intact?: yes  Right ear intact?: yes                    Left ear TEED scale: Grade 0  Right ear TEED scale: Grade 0   Pretreatment heart and lung assessment: Pretreatment heart and lung auscltation unremarkable  Patient cleared for HBOT     Treatment details:  ALIA Rate: 2 5  Started Compression: 1054  Reached Compression: 1109  Total Compression Time: 15 (Minutes)  Total Holding Time: 100 (Minutes)  Started Decompression: 1249  Reached Surface: 1109  Total Decompression Time: 15 (Minutes)  Total Airbreaks: 100 (Minutes)  Total Time of Treatment: 125 (Minutes)  Symptoms Noted During Treatment: None (Minutes)    Post treatment details:  Left ear clear?: yes  Right ear clear?: yes  Left ears intact?: yes  Right ears intact?: yes                    Left ear TEED scale: Grade 0  Right ear TEED scale: Grade 0  Post treatment heart and lung assessment: Post treatment heart and lung auscltation unremarkable  Patient cleared for discahrge  Tolerated treatment well         Vital Signs:  HBO Glucose Reference Range: 100-350 mg/dl   Pre-Treatment Post-Treatment   Time vitals are taken: 1045 Time vitals are taken: 1315   Blood Pressure: 146/78 Blood Pressure: 138/76   Pulse: 72 Pulse: 84   Resp: 16 Resp: 16   Temp: 95 5 °F (35 3 °C) Temp: 96 9 °F (36 1 °C)               Allergies   Allergen Reactions    Penicillins Rash     Patient Active Problem List    Diagnosis Date Noted    Inflammatory condition of jaw 08/11/2020    Fatigue 05/30/2019    Hypertension 05/30/2019     No orders of the defined types were placed in this encounter

## 2020-08-25 ENCOUNTER — OFFICE VISIT (OUTPATIENT)
Dept: WOUND CARE | Facility: HOSPITAL | Age: 65
End: 2020-08-25
Payer: COMMERCIAL

## 2020-08-25 DIAGNOSIS — M27.2 INFLAMMATORY CONDITION OF JAW: Primary | ICD-10-CM

## 2020-08-25 PROCEDURE — G0277 HBOT, FULL BODY CHAMBER, 30M: HCPCS | Performed by: FAMILY MEDICINE

## 2020-08-25 PROCEDURE — 99183 HYPERBARIC OXYGEN THERAPY: CPT | Performed by: FAMILY MEDICINE

## 2020-08-25 NOTE — PROGRESS NOTES
HBO Treatment Course Details       Treatment Notes:  HBO supervised  T/olerated well  No complaints  Treatment Course Number: 17  Total Treatments Ordered:  20     Ordering Physician:  Neno  HBO ordered today  Diagnosis:   1  Inflammatory condition of jaw         HBO Treatment Details:  In-Patient Visit: no  Treatment Length:90 Minutes(Minutes)  Chamber #:      Pre-Treatment details:  Pre-treatment protocol Treatment Protocol: 2 5 ALIA X 90 minutes w/ 100% oxygen, two 5 minute air breaks  Left ear clear?: yes  Right ear clear?: yes  Left ear intact?: yes  Right ear intact?: yes                    Left ear TEED scale: Grade 0  Right ear TEED scale: Grade 0   Pretreatment heart and lung assessment: Pretreatment heart and lung auscltation unremarkable  Patient cleared for HBOT     Treatment details:  ALIA Rate: 2 5  Started Compression: 1103  Reached Compression: 1118  Total Compression Time: 15 (Minutes)  Total Holding Time: 100 (Minutes)  Started Decompression: 1258  Reached Surface: 1118  Total Decompression Time: 10 (Minutes)  Total Airbreaks: 100 (Minutes)  Total Time of Treatment: 120 (Minutes)  Symptoms Noted During Treatment: None (Minutes)    Post treatment details:  Left ear clear?: yes  Right ear clear?: yes  Left ears intact?: yes  Right ears intact?: yes                    Left ear TEED scale: Grade 0  Right ear TEED scale: Grade 0  Post treatment heart and lung assessment: Post treatment heart and lung auscltation unremarkable  Patient cleared for discahrge  Tolerated treatment well         Vital Signs:  HBO Glucose Reference Range: 100-350 mg/dl   Pre-Treatment Post-Treatment   Time vitals are taken: 1050 Time vitals are taken: 1310   Blood Pressure: 120/68 Blood Pressure: 128/80   Pulse: 84 Pulse: 68   Resp: 18 Resp: 16   Temp: 96 9 °F (36 1 °C) Temp: 96 5 °F (35 8 °C)                 Allergies   Allergen Reactions    Penicillins Rash     Patient Active Problem List    Diagnosis Date Noted    Inflammatory condition of jaw 08/11/2020    Fatigue 05/30/2019    Hypertension 05/30/2019     No orders of the defined types were placed in this encounter

## 2020-08-26 ENCOUNTER — OFFICE VISIT (OUTPATIENT)
Dept: WOUND CARE | Facility: HOSPITAL | Age: 65
End: 2020-08-26
Payer: COMMERCIAL

## 2020-08-26 DIAGNOSIS — M27.2 INFLAMMATORY CONDITION OF JAW: Primary | ICD-10-CM

## 2020-08-26 PROCEDURE — G0277 HBOT, FULL BODY CHAMBER, 30M: HCPCS | Performed by: FAMILY MEDICINE

## 2020-08-26 PROCEDURE — 99183 HYPERBARIC OXYGEN THERAPY: CPT | Performed by: FAMILY MEDICINE

## 2020-08-26 NOTE — PROGRESS NOTES
HBO Treatment Course Details       Treatment Notes:  HBO supervised  Tolerated well  No complaints  Treatment Course Number: 18  Total Treatments Ordered:  20     Ordering Physician:  Neno  HBO ordered today  Diagnosis:   1  Inflammatory condition of jaw         HBO Treatment Details:  In-Patient Visit: no  Treatment Length:90 Minutes(Minutes)  Chamber #:      Pre-Treatment details:  Pre-treatment protocol Treatment Protocol: 2 5 ALIA X 90 minutes w/ 100% oxygen, two 5 minute air breaks  Left ear clear?: yes  Right ear clear?: yes  Left ear intact?: yes  Right ear intact?: yes  Left ear color: dark  Right ear color: dark              Left ear TEED scale: Grade I  Right ear TEED scale: Grade I   Pretreatment heart and lung assessment: Pretreatment heart and lung auscltation unremarkable  Patient cleared for HBOT     Treatment details:  ALIA Rate: 2 5  Started Compression: 1100  Reached Compression: 1115  Total Compression Time: 15 (Minutes)  Total Holding Time: 100 (Minutes)  Started Decompression: 1255  Reached Surface: 1115  Total Decompression Time: 10 (Minutes)  Total Airbreaks: 100 (Minutes)  Total Time of Treatment: 120 (Minutes)  Symptoms Noted During Treatment: None (Minutes)    Post treatment details:  Left ear clear?: yes  Right ear clear?: yes  Left ears intact?: yes  Right ears intact?: yes  Left ear color: Dark  Right ear color: Dark              Left ear TEED scale: Grade I  Right ear TEED scale: Grade I  Post treatment heart and lung assessment: Post treatment heart and lung auscltation unremarkable  Patient cleared for discahrge  Tolerated treatment well         Vital Signs:  HBO Glucose Reference Range: 100-350 mg/dl   Pre-Treatment Post-Treatment   Time vitals are taken: 1045 Time vitals are taken: 1307   Blood Pressure: 130/80 Blood Pressure: 128/82   Pulse: 68 Pulse: 72   Resp: 18 Resp: 16   Temp: 96 5 °F (35 8 °C) Temp: 96 1 °F (35 6 °C)               Allergies   Allergen Reactions    Penicillins Rash     Patient Active Problem List    Diagnosis Date Noted    Inflammatory condition of jaw 08/11/2020    Fatigue 05/30/2019    Hypertension 05/30/2019

## 2020-08-27 ENCOUNTER — OFFICE VISIT (OUTPATIENT)
Dept: WOUND CARE | Facility: HOSPITAL | Age: 65
End: 2020-08-27
Payer: COMMERCIAL

## 2020-08-27 DIAGNOSIS — M27.2 INFLAMMATORY CONDITION OF JAW: Primary | ICD-10-CM

## 2020-08-27 PROCEDURE — G0277 HBOT, FULL BODY CHAMBER, 30M: HCPCS | Performed by: FAMILY MEDICINE

## 2020-08-27 PROCEDURE — 99183 HYPERBARIC OXYGEN THERAPY: CPT | Performed by: FAMILY MEDICINE

## 2020-08-27 NOTE — PROGRESS NOTES
HBO Treatment Course Details       Treatment Notes:  HBO supervised  Tolerated well  No complaints  Treatment Course Number: 19  Total Treatments Ordered:  20     Ordering Physician:  Neno  HBO ordered today  Diagnosis:   1  Inflammatory condition of jaw         HBO Treatment Details:  In-Patient Visit: no  Treatment Length:90 Minutes(Minutes)  Chamber #:      Pre-Treatment details:  Pre-treatment protocol Treatment Protocol: 2 5 ALIA X 90 minutes w/ 100% oxygen, two 5 minute air breaks  Left ear clear?: yes  Right ear clear?: yes  Left ear intact?: yes  Right ear intact?: yes  Left ear color: Dark  Right ear color: Dark              Left ear TEED scale: Grade 0  Right ear TEED scale: Grade 0   Pretreatment heart and lung assessment: Pretreatment heart and lung auscltation unremarkable  Patient cleared for HBOT     Treatment details:  ALIA Rate: 2 5  Started Compression: 1058  Reached Compression: 1113  Total Compression Time: 15 (Minutes)  Total Holding Time: 100 (Minutes)  Started Decompression: 1253  Reached Surface: 1113  Total Decompression Time: 10 (Minutes)  Total Airbreaks: 100 (Minutes)  Total Time of Treatment: 120 (Minutes)  Symptoms Noted During Treatment: None (Minutes)    Post treatment details:  Left ear clear?: yes  Right ear clear?: yes  Left ears intact?: yes  Right ears intact?: yes  Left ear color: dark  Right ear color: dark              Left ear TEED scale: Grade I  Right ear TEED scale: Grade I  Post treatment heart and lung assessment: Post treatment heart and lung auscltation unremarkable  Patient cleared for discahrge  Tolerated treatment well         Vital Signs:  HBO Glucose Reference Range: 100-350 mg/dl   Pre-Treatment Post-Treatment   Time vitals are taken: 1040 Time vitals are taken: 1305   Blood Pressure: 128/80 Blood Pressure: 124/84   Pulse: 64 Pulse: 80   Resp: 18 Resp: 16   Temp: 97 °F (36 1 °C) Temp: 98 9 °F (37 2 °C)               Allergies   Allergen Reactions    Penicillins Rash     Patient Active Problem List    Diagnosis Date Noted    Inflammatory condition of jaw 08/11/2020    Fatigue 05/30/2019    Hypertension 05/30/2019     No orders of the defined types were placed in this encounter

## 2020-08-31 ENCOUNTER — OFFICE VISIT (OUTPATIENT)
Dept: WOUND CARE | Facility: HOSPITAL | Age: 65
End: 2020-08-31
Payer: COMMERCIAL

## 2020-08-31 DIAGNOSIS — M27.2 INFLAMMATORY CONDITION OF JAW: ICD-10-CM

## 2020-08-31 PROCEDURE — G0277 HBOT, FULL BODY CHAMBER, 30M: HCPCS

## 2020-08-31 PROCEDURE — 99183 HYPERBARIC OXYGEN THERAPY: CPT | Performed by: PHYSICIAN ASSISTANT

## 2020-08-31 NOTE — PROGRESS NOTES
HBO Treatment Course Details       Treatment Notes: HBO supervised, patient tolerated well  No complaints  Treatment Course Number: 20  Total Treatments Ordered:  20         Diagnosis: No diagnosis found  HBO Treatment Details:  In-Patient Visit: no  Treatment Length:90 Minutes(Minutes)  Chamber #:      Pre-Treatment details:  Pre-treatment protocol Treatment Protocol: 2 5 ALIA X 90 minutes w/ 100% oxygen, two 5 minute air breaks  Left ear clear?: yes  Right ear clear?: yes  Left ear intact?: yes  Right ear intact?: yes                    Left ear TEED scale: Grade 0  Right ear TEED scale: Grade 0   Pretreatment heart and lung assessment: Pretreatment heart and lung auscltation unremarkable  Patient cleared for HBOT     Treatment details:  ALIA Rate: 2 5  Started Compression: 1245  Reached Compression: 1300  Total Compression Time: 15 (Minutes)  Total Holding Time: 100 (Minutes)  Started Decompression: 1440  Reached Surface: 1300  Total Decompression Time: 10 (Minutes)  Total Airbreaks: 100 (Minutes)  Total Time of Treatment: 120 (Minutes)  Symptoms Noted During Treatment: None (Minutes)    Post treatment details:  Left ear clear?: yes  Right ear clear?: yes  Left ears intact?: yes  Right ears intact?: yes                    Left ear TEED scale: Grade 0  Right ear TEED scale: Grade 0  Post treatment heart and lung assessment: Post treatment heart and lung auscltation unremarkable  Patient cleared for discahrge  Tolerated treatment well         Vital Signs:  HBO Glucose Reference Range: 100-350 mg/dl   Pre-Treatment Post-Treatment   Time vitals are taken: 1240 Time vitals are taken: 1453   Blood Pressure: 122/82 Blood Pressure: 132/86   Pulse: 68 Pulse: 68   Resp: 18 Resp: 16   Temp: 96 6 °F (35 9 °C) Temp: 96 1 °F (35 6 °C)                 Allergies   Allergen Reactions    Penicillins Rash     Patient Active Problem List    Diagnosis Date Noted    Inflammatory condition of jaw 08/11/2020    Fatigue 05/30/2019  Hypertension 05/30/2019     No orders of the defined types were placed in this encounter    Compatible tolerance to HBO

## 2020-09-01 ENCOUNTER — OFFICE VISIT (OUTPATIENT)
Dept: WOUND CARE | Facility: HOSPITAL | Age: 65
End: 2020-09-01
Payer: COMMERCIAL

## 2020-09-01 DIAGNOSIS — M27.2 INFLAMMATORY CONDITION OF JAW: Primary | ICD-10-CM

## 2020-09-01 PROCEDURE — G0277 HBOT, FULL BODY CHAMBER, 30M: HCPCS | Performed by: FAMILY MEDICINE

## 2020-09-01 PROCEDURE — 99183 HYPERBARIC OXYGEN THERAPY: CPT | Performed by: FAMILY MEDICINE

## 2020-09-01 NOTE — PROGRESS NOTES
HBO Treatment Course Details       Treatment Notes:  HBO supervise  Tolerated well  No complaints  Treatment Course Number: 21  Total Treatments Ordered:  40     Ordering Physician:  Neno    Diagnosis:   1  Inflammatory condition of jaw  Hyperbaric oxygen thearpy    Hyperbaric oxygen thearpy       HBO Treatment Details:  In-Patient Visit: no  Treatment Length:90 Minutes(Minutes)  Chamber #:      Pre-Treatment details:  Pre-treatment protocol Treatment Protocol: 2 5 ALIA X 90 minutes w/ 100% oxygen, two 5 minute air breaks  Left ear clear?: yes  Right ear clear?: yes  Left ear intact?: yes  Right ear intact?: yes  Left ear color: dark  Right ear color: dark              Left ear TEED scale: Grade I  Right ear TEED scale: Grade I         Treatment details:  ALIA Rate: 2 5  Started Compression: 1200  Reached Compression: 1215  Total Compression Time: 15 (Minutes)  Total Holding Time: 100 (Minutes)  Started Decompression: 1355  Reached Surface: 1215  Total Decompression Time: 10 (Minutes)  Total Airbreaks: 100 (Minutes)  Total Time of Treatment: 120 (Minutes)  Symptoms Noted During Treatment: None (Minutes)    Post treatment details:  Left ear clear?: yes  Right ear clear?: yes  Left ears intact?: yes  Right ears intact?: yes  Left ear color: dark  Right ear color: dark              Left ear TEED scale: Grade I  Right ear TEED scale: Grade I  Post treatment heart and lung assessment: Post treatment heart and lung auscltation unremarkable  Patient cleared for discahrge  Tolerated treatment well         Vital Signs:  HBO Glucose Reference Range: 100-350 mg/dl   Pre-Treatment Post-Treatment   Time vitals are taken: 1155 Time vitals are taken: 1410   Blood Pressure: 122/76 Blood Pressure: 142/80   Pulse: 76 Pulse: 68   Resp: 16 Resp: 16   Temp: 96 8 °F (36 °C) Temp: 96 2 °F (35 7 °C)               Allergies   Allergen Reactions    Penicillins Rash     Patient Active Problem List    Diagnosis Date Noted    Inflammatory condition of jaw 08/11/2020    Fatigue 05/30/2019    Hypertension 05/30/2019     Orders Placed This Encounter   Procedures    Hyperbaric oxygen thearpy     Ordered extra her 20 hbo treatments for total of 40  Patient has documented 0 RN with osteomyelitis currently treated via PICC line  Patient is status post hospitalization and surgery on mandible       Standing Status:   Standing     Number of Occurrences:   20     Standing Expiration Date:   10/1/2020     Order Specific Question:   ALIA Rate     Answer:   2 5 ALIA for 90 Minutes with 2 five minute air breaks; 100% oxygen     Order Specific Question:   Descent and ascent rate     Answer:   Descent and ascent rate of up to 2 psi/min depending upon the patient's ability to clear ears and comfort     Order Specific Question:   Frequency     Answer:   5 x week     Order Specific Question:   Total number of treatments     Answer:   40   Compattible tolerance to HBO

## 2020-09-02 ENCOUNTER — OFFICE VISIT (OUTPATIENT)
Dept: WOUND CARE | Facility: HOSPITAL | Age: 65
End: 2020-09-02
Payer: COMMERCIAL

## 2020-09-02 DIAGNOSIS — M27.2 INFLAMMATORY CONDITION OF JAW: ICD-10-CM

## 2020-09-02 PROCEDURE — 99183 HYPERBARIC OXYGEN THERAPY: CPT | Performed by: FAMILY MEDICINE

## 2020-09-02 PROCEDURE — G0277 HBOT, FULL BODY CHAMBER, 30M: HCPCS | Performed by: FAMILY MEDICINE

## 2020-09-02 NOTE — PROGRESS NOTES
HBO Treatment Course Details       Treatment Notes:  HBO supervised  Tolerated well  No complaints  Treatment Course Number: 22  Total Treatments Ordered:  40     Ordering Physician:  Neno  HBO ordered today  Diagnosis:   1  Inflammatory condition of jaw  Hyperbaric oxygen thearpy       HBO Treatment Details:  In-Patient Visit: no  Treatment Length:90 Minutes(Minutes)  Chamber #:      Pre-Treatment details:  Pre-treatment protocol Treatment Protocol: 2 5 ALIA X 90 minutes w/ 100% oxygen, two 5 minute air breaks  Left ear clear?: yes  Right ear clear?: yes  Left ear intact?: yes  Right ear intact?: yes  Left ear color: Dark  Right ear color: Dark              Left ear TEED scale: Grade I  Right ear TEED scale: Grade I   Pretreatment heart and lung assessment: Pretreatment heart and lung auscltation unremarkable  Patient cleared for HBOT     Treatment details:  ALIA Rate: 2 5  Started Compression: 1340  Reached Compression: 1355  Total Compression Time: 15 (Minutes)  Total Holding Time: 100 (Minutes)  Started Decompression: 1535  Reached Surface: 8953  Total Decompression Time: 10 (Minutes)  Total Airbreaks: 100 (Minutes)  Total Time of Treatment: 120 (Minutes)  Symptoms Noted During Treatment: None (Minutes)    Post treatment details:  Left ear clear?: yes  Right ear clear?: yes  Left ears intact?: yes  Right ears intact?: yes  Left ear color: Dark  Right ear color: Dark              Left ear TEED scale: Grade I  Right ear TEED scale: Grade I  Post treatment heart and lung assessment: Post treatment heart and lung auscltation unremarkable  Patient cleared for discahrge  Tolerated treatment well         Vital Signs:  HBO Glucose Reference Range: 100-350 mg/dl   Pre-Treatment Post-Treatment   Time vitals are taken: 1328 Time vitals are taken: 1548   Blood Pressure: 124/84 Blood Pressure: 124/80   Pulse: 76 Pulse: 76   Resp: 18 Resp: 16   Temp: 96 1 °F (35 6 °C) Temp: 96 1 °F (35 6 °C)             Additional information:  Spoke with his oral surgeon today  He is concerned about recurrent ORN after each of his extractions and recent ORN exacerbation and osteomyelitis  Patient was hospitalized for his osteomyelitis and currently has a PICC line  He had surgery as well  After discussion, it is agreed that the patient should have a total of 40 HBO treatments given the complications with ORN and osteomyelitis that he has  Allergies   Allergen Reactions    Penicillins Rash     Patient Active Problem List    Diagnosis Date Noted    Inflammatory condition of jaw 08/11/2020    Fatigue 05/30/2019    Hypertension 05/30/2019     No orders of the defined types were placed in this encounter

## 2020-09-03 ENCOUNTER — OFFICE VISIT (OUTPATIENT)
Dept: WOUND CARE | Facility: HOSPITAL | Age: 65
End: 2020-09-03
Payer: COMMERCIAL

## 2020-09-03 DIAGNOSIS — M27.2 INFLAMMATORY CONDITION OF JAW: Primary | ICD-10-CM

## 2020-09-03 PROCEDURE — G0277 HBOT, FULL BODY CHAMBER, 30M: HCPCS | Performed by: FAMILY MEDICINE

## 2020-09-03 PROCEDURE — 99183 HYPERBARIC OXYGEN THERAPY: CPT | Performed by: FAMILY MEDICINE

## 2020-09-03 NOTE — PROGRESS NOTES
HBO Treatment Course Details       Treatment Notes:  HBO supervise  Tolerated well  No complaints  Treatment Course Number: 23  Total Treatments Ordered:  40     Ordering Physician:  Neno  HBO ordered today  Diagnosis:   1  Inflammatory condition of jaw  Hyperbaric oxygen thearpy       HBO Treatment Details:  In-Patient Visit: no  Treatment Length:90 Minutes(Minutes)  Chamber #:      Pre-Treatment details:  Pre-treatment protocol Treatment Protocol: 2 5 ALIA X 90 minutes w/ 100% oxygen, two 5 minute air breaks  Left ear clear?: yes  Right ear clear?: yes  Left ear intact?: yes  Right ear intact?: yes  Left ear color: dark  Right ear color: dark              Left ear TEED scale: Grade I  Right ear TEED scale: Grade I   Pretreatment heart and lung assessment: Pretreatment heart and lung auscltation unremarkable  Patient cleared for HBOT     Treatment details:  ALIA Rate: 2 5  Started Compression: 0825  Reached Compression: 0840  Total Compression Time: 15 (Minutes)  Total Holding Time: 100 (Minutes)  Started Decompression: 1020  Reached Surface: 0840  Total Decompression Time: 10 (Minutes)  Total Airbreaks: 100 (Minutes)  Total Time of Treatment: 120 (Minutes)  Symptoms Noted During Treatment: None (Minutes)    Post treatment details:  Left ear clear?: yes  Right ear clear?: yes  Left ears intact?: yes     Left ear color: dark  Right ear color: dark              Left ear TEED scale: Grade I  Right ear TEED scale: Grade I  Post treatment heart and lung assessment: Post treatment heart and lung auscltation unremarkable  Patient cleared for discahrge  Tolerated treatment well         Vital Signs:  HBO Glucose Reference Range: 100-350 mg/dl   Pre-Treatment Post-Treatment   Time vitals are taken: 0819 Time vitals are taken: 1031   Blood Pressure: 126/82 Blood Pressure: 128/82   Pulse: 80 Pulse: 80   Resp: 18 Resp: 16   Temp: 96 8 °F (36 °C) Temp: 96 2 °F (35 7 °C)               Allergies   Allergen Reactions    Penicillins Rash     Patient Active Problem List    Diagnosis Date Noted    Inflammatory condition of jaw 08/11/2020    Fatigue 05/30/2019    Hypertension 05/30/2019     No orders of the defined types were placed in this encounter

## 2020-09-04 ENCOUNTER — OFFICE VISIT (OUTPATIENT)
Dept: WOUND CARE | Facility: HOSPITAL | Age: 65
End: 2020-09-04
Payer: COMMERCIAL

## 2020-09-04 DIAGNOSIS — M27.2 INFLAMMATORY CONDITION OF JAW: ICD-10-CM

## 2020-09-04 PROCEDURE — 99183 HYPERBARIC OXYGEN THERAPY: CPT | Performed by: FAMILY MEDICINE

## 2020-09-04 PROCEDURE — G0277 HBOT, FULL BODY CHAMBER, 30M: HCPCS | Performed by: FAMILY MEDICINE

## 2020-09-04 NOTE — PROGRESS NOTES
HBO Treatment Course Details       Treatment Notes:  HBO supervised  Tolerated well  No complaints  Treatment Course Number: 24  Total Treatments Ordered:  40     Ordering Physician:  Aleksandr Morrow ordered  Diagnosis:   1  Inflammatory condition of jaw  Hyperbaric oxygen thearpy       HBO Treatment Details:  In-Patient Visit: no  Treatment Length: (Minutes)  Chamber #:      Pre-Treatment details:  Pre-treatment protocol Treatment Protocol: 2 5 ALIA X 90 minutes w/ 100% oxygen, two 5 minute air breaks  Left ear clear?: yes  Right ear clear?: yes  Left ear intact?: yes  Right ear intact?: yes  Left ear color: Dark  Right ear color: Dark              Left ear TEED scale: Grade I  Right ear TEED scale: Grade I   Pretreatment heart and lung assessment: Pretreatment heart and lung auscltation unremarkable  Patient cleared for HBOT     Treatment details:  ALIA Rate: 2 5  Started Compression: 0953  Reached Compression: 1008  Total Compression Time: 15 (Minutes)  Total Holding Time: 100 (Minutes)  Started Decompression: 1148  Reached Surface: 1008  Total Decompression Time: 10 (Minutes)  Total Airbreaks: 100 (Minutes)  Total Time of Treatment: 120 (Minutes)  Symptoms Noted During Treatment: None (Minutes)    Post treatment details:  Left ear clear?: yes  Right ear clear?: yes  Left ears intact?: yes  Right ears intact?: yes  Left ear color: dark  Right ear color: dark              Left ear TEED scale: Grade I  Right ear TEED scale: Grade I  Post treatment heart and lung assessment: Post treatment heart and lung auscltation unremarkable  Patient cleared for discahrge  Tolerated treatment well         Vital Signs:  HBO Glucose Reference Range: 100-350 mg/dl   Pre-Treatment Post-Treatment   Time vitals are taken: 0950 Time vitals are taken: 1200   Blood Pressure: 128/80 Blood Pressure: 138/88   Pulse: 72 Pulse: 68   Resp: 18 Resp: 16   Temp: 96 5 °F (35 8 °C) Temp: 96 1 °F (35 6 °C)               Allergies   Allergen Reactions    Penicillins Rash     Patient Active Problem List    Diagnosis Date Noted    Inflammatory condition of jaw 08/11/2020    Fatigue 05/30/2019    Hypertension 05/30/2019     No orders of the defined types were placed in this encounter

## 2020-09-08 ENCOUNTER — OFFICE VISIT (OUTPATIENT)
Dept: WOUND CARE | Facility: HOSPITAL | Age: 65
End: 2020-09-08
Payer: COMMERCIAL

## 2020-09-08 DIAGNOSIS — M27.2 INFLAMMATORY CONDITION OF JAW: ICD-10-CM

## 2020-09-08 PROCEDURE — 99183 HYPERBARIC OXYGEN THERAPY: CPT | Performed by: PHYSICIAN ASSISTANT

## 2020-09-08 PROCEDURE — G0277 HBOT, FULL BODY CHAMBER, 30M: HCPCS

## 2020-09-10 ENCOUNTER — OFFICE VISIT (OUTPATIENT)
Dept: WOUND CARE | Facility: HOSPITAL | Age: 65
End: 2020-09-10
Payer: COMMERCIAL

## 2020-09-10 DIAGNOSIS — M27.2 INFLAMMATORY CONDITION OF JAW: ICD-10-CM

## 2020-09-10 PROCEDURE — 99183 HYPERBARIC OXYGEN THERAPY: CPT | Performed by: FAMILY MEDICINE

## 2020-09-10 PROCEDURE — G0277 HBOT, FULL BODY CHAMBER, 30M: HCPCS | Performed by: FAMILY MEDICINE

## 2020-09-10 NOTE — PROGRESS NOTES
HBO Treatment Course Details       Treatment Notes:  HBO supervised  Tolerated well  No complaints  Treatment Course Number: 26  Total Treatments Ordered:  40     Ordering Physician:  Neno  HBO order today  Diagnosis:   1  Inflammatory condition of jaw  Hyperbaric oxygen thearpy       HBO Treatment Details:  In-Patient Visit: no  Treatment Length:90 Minutes(Minutes)  Chamber #:      Pre-Treatment details:  Pre-treatment protocol Treatment Protocol: 2 5 ALIA X 90 minutes w/ 100% oxygen, two 5 minute air breaks  Left ear clear?: yes  Right ear clear?: yes  Left ear intact?: yes  Right ear intact?: yes  Left ear color: dark  Right ear color: dark              Left ear TEED scale: Grade I  Right ear TEED scale: Grade I         Treatment details:  ALIA Rate: 2 5  Started Compression: 1045  Reached Compression: 1100  Total Compression Time: 15 (Minutes)  Total Holding Time: 100 (Minutes)  Started Decompression: 1240  Reached Surface: 1100  Total Decompression Time: 10 (Minutes)  Total Airbreaks: 100 (Minutes)  Total Time of Treatment: 120 (Minutes)  Symptoms Noted During Treatment: None (Minutes)    Post treatment details:  Left ear clear?: yes  Right ear clear?: yes  Left ears intact?: yes  Right ears intact?: yes  Left ear color: dark  Right ear color: dark              Left ear TEED scale: Grade I  Right ear TEED scale: Grade I  Post treatment heart and lung assessment: Post treatment heart and lung auscltation unremarkable  Patient cleared for discahrge  Tolerated treatment well         Vital Signs:  HBO Glucose Reference Range: 100-350 mg/dl   Pre-Treatment Post-Treatment   Time vitals are taken: 1040 Time vitals are taken: 1253   Blood Pressure: 124/84 Blood Pressure: 126/80   Pulse: 68 Pulse: 68   Resp: 18 Resp: 16   Temp: 96 1 °F (35 6 °C) Temp: 96 7 °F (35 9 °C)                 Allergies   Allergen Reactions    Penicillins Rash     Patient Active Problem List    Diagnosis Date Noted    Inflammatory condition of jaw 08/11/2020    Fatigue 05/30/2019    Hypertension 05/30/2019     No orders of the defined types were placed in this encounter    Tolerated treatment well, dozed during most of treatment

## 2020-09-14 ENCOUNTER — OFFICE VISIT (OUTPATIENT)
Dept: WOUND CARE | Facility: HOSPITAL | Age: 65
End: 2020-09-14
Payer: COMMERCIAL

## 2020-09-14 DIAGNOSIS — M27.2 INFLAMMATORY CONDITION OF JAW: ICD-10-CM

## 2020-09-14 PROCEDURE — 99183 HYPERBARIC OXYGEN THERAPY: CPT | Performed by: PHYSICIAN ASSISTANT

## 2020-09-14 PROCEDURE — G0277 HBOT, FULL BODY CHAMBER, 30M: HCPCS

## 2020-09-14 NOTE — PROGRESS NOTES
HBO Treatment Course Details       Treatment Notes: Patient tolerated treatment without complication  All questions and concerns addressed     Treatment Course Number: 27  Total Treatments Ordered:  40       Diagnosis:   1  Inflammatory condition of jaw  Hyperbaric oxygen thearpy       HBO Treatment Details:  In-Patient Visit: no  Treatment Length:90 Minutes(Minutes)  Chamber #:      Pre-Treatment details:  Pre-treatment protocol Treatment Protocol: 2 5 ALIA X 90 minutes w/ 100% oxygen, two 5 minute air breaks  Left ear clear?: yes  Right ear clear?: yes  Left ear intact?: yes  Right ear intact?: yes        PE Tubes present, Left ear?: no  PE Tubes present, Right ear?: no  Left ear irrigated?: no  Right ear irrigated?: no  Left ear TEED scale: Grade 0  Right ear TEED scale: Grade 0   Pretreatment heart and lung assessment: Pretreatment heart and lung auscltation unremarkable  Patient cleared for HBOT     Treatment details:  ALIA Rate: 2 5  Started Compression: 0832  Reached Compression: 0847  Total Compression Time: 15 (Minutes)  Total Holding Time: 100 (Minutes)  Started Decompression: 1027  Reached Surface: 0847  Total Decompression Time: 10 (Minutes)  Total Airbreaks: 100 (Minutes)  Total Time of Treatment: 120 (Minutes)  Symptoms Noted During Treatment: None (Minutes)    Post treatment details:  Left ear clear?: yes  Right ear clear?: yes  Left ears intact?: yes  Right ears intact?: yes                    Left ear TEED scale: Grade 0  Right ear TEED scale: Grade 0  Post treatment heart and lung assessment: Post treatment heart and lung auscltation unremarkable  Patient cleared for discahrge  Tolerated treatment well         Vital Signs:  HBO Glucose Reference Range: 100-350 mg/dl   Pre-Treatment Post-Treatment   Time vitals are taken: 0725 Time vitals are taken: 1038   Blood Pressure: 128/84 Blood Pressure: 128/82   Pulse: 68 Pulse: 60   Resp: 18 Resp: 16   Temp: 96 3 °F (35 7 °C) Temp: 96 1 °F (35 6 °C) Allergies   Allergen Reactions    Penicillins Rash     Patient Active Problem List    Diagnosis Date Noted    Inflammatory condition of jaw 08/11/2020    Fatigue 05/30/2019    Hypertension 05/30/2019     No orders of the defined types were placed in this encounter    Compatible tolerance to HBO

## 2020-09-15 ENCOUNTER — OFFICE VISIT (OUTPATIENT)
Dept: WOUND CARE | Facility: HOSPITAL | Age: 65
End: 2020-09-15
Payer: COMMERCIAL

## 2020-09-15 DIAGNOSIS — M27.2 INFLAMMATORY CONDITION OF JAW: ICD-10-CM

## 2020-09-15 PROCEDURE — NS001 PR NO SIGNATURE OR ATTESTATION: Performed by: PHYSICIAN ASSISTANT

## 2020-09-15 PROCEDURE — G0277 HBOT, FULL BODY CHAMBER, 30M: HCPCS

## 2020-09-15 NOTE — PROGRESS NOTES
HBO Treatment Course Details       Treatment Notes: The patient denies any problems with his last treatment  He denies any signs or symptoms in the past 24 hours that impact this treatment  Treatment Course Number: 28  Total Treatments Ordered:  40     Ordering Physician: Ida Singh    Diagnosis:   1  Inflammatory condition of jaw  Hyperbaric oxygen thearpy       HBO Treatment Details:  In-Patient Visit: no  Treatment Length:90 Minutes(Minutes)  Chamber #:      Pre-Treatment details:  Pre-treatment protocol Treatment Protocol: 2 5 ALIA X 90 minutes w/ 100% oxygen, two 5 minute air breaks                                             Treatment details:  ALIA Rate: 2 5  Started Compression: 1105  Reached Compression: 1120  Total Compression Time: 15 (Minutes)  Total Holding Time: 100 (Minutes)  Started Decompression: 1300  Reached Surface: 1120  Total Decompression Time: 10 (Minutes)  Total Airbreaks: 100 (Minutes)  Total Time of Treatment: 120 (Minutes)  Symptoms Noted During Treatment: None (Minutes)    Post treatment details:                                              Vital Signs:  HBO Glucose Reference Range: 100-350 mg/dl   Pre-Treatment Post-Treatment   Time vitals are taken: 1055 Time vitals are taken: 1311   Blood Pressure: 120/70 Blood Pressure: 120/84   Pulse: 76 Pulse: 60   Resp: 18 Resp: 16   Temp: 96 5 °F (35 8 °C) Temp: 96 6 °F (35 9 °C)             Additional information:        Allergies   Allergen Reactions    Penicillins Rash     Patient Active Problem List    Diagnosis Date Noted    Inflammatory condition of jaw 08/11/2020    Fatigue 05/30/2019    Hypertension 05/30/2019     No orders of the defined types were placed in this encounter    Compatible tolerance to HBO

## 2020-09-16 ENCOUNTER — OFFICE VISIT (OUTPATIENT)
Dept: WOUND CARE | Facility: HOSPITAL | Age: 65
End: 2020-09-16
Payer: COMMERCIAL

## 2020-09-16 DIAGNOSIS — M27.2 INFLAMMATORY CONDITION OF JAW: ICD-10-CM

## 2020-09-16 PROCEDURE — 99183 HYPERBARIC OXYGEN THERAPY: CPT | Performed by: FAMILY MEDICINE

## 2020-09-16 PROCEDURE — G0277 HBOT, FULL BODY CHAMBER, 30M: HCPCS | Performed by: FAMILY MEDICINE

## 2020-09-16 NOTE — PROGRESS NOTES
HBO Treatment Course Details       Treatment Notes:  HBO supervised  Tolerated well  No complaints  Treatment Course Number: 29  Total Treatments Ordered:  40     Ordering Physician:  Neno  HBO ordered today  Diagnosis:   1  Inflammatory condition of jaw  Hyperbaric oxygen thearpy       HBO Treatment Details:  In-Patient Visit: no  Treatment Length:90 Minutes(Minutes)  Chamber #:      Pre-Treatment details:  Pre-treatment protocol Treatment Protocol: 2 5 ALIA X 90 minutes w/ 100% oxygen, two 5 minute air breaks  Left ear clear?: yes  Right ear clear?: yes  Left ear intact?: yes  Right ear intact?: yes  Left ear color: gray  Right ear color: dark              Left ear TEED scale: Grade I  Right ear TEED scale: Grade I   Pretreatment heart and lung assessment: Pretreatment heart and lung auscltation unremarkable  Patient cleared for HBOT     Treatment details:  ALIA Rate: 2 5  Started Compression: 0835  Reached Compression: 0850  Total Compression Time: 15 (Minutes)  Total Holding Time: 100 (Minutes)  Started Decompression: 1030  Reached Surface: 0850  Total Decompression Time: 10 (Minutes)  Total Airbreaks: 100 (Minutes)  Total Time of Treatment: 120 (Minutes)  Symptoms Noted During Treatment: None (Minutes)    Post treatment details:  Left ear clear?: yes  Right ear clear?: yes  Left ears intact?: yes  Right ears intact?: yes  Left ear color: gray  Right ear color: dark              Left ear TEED scale: Grade I  Right ear TEED scale: Grade I  Post treatment heart and lung assessment: Post treatment heart and lung auscltation unremarkable  Patient cleared for discahrge  Tolerated treatment well         Vital Signs:  HBO Glucose Reference Range: 100-350 mg/dl   Pre-Treatment Post-Treatment   Time vitals are taken: 0830 Time vitals are taken: 1041   Blood Pressure: 122/82 Blood Pressure: 124/84   Pulse: 68 Pulse: 68   Resp: 18 Resp: 16   Temp: 96 1 °F (35 6 °C) Temp: 96 6 °F (35 9 °C)                 Allergies Allergen Reactions    Penicillins Rash     Patient Active Problem List    Diagnosis Date Noted    Inflammatory condition of jaw 08/11/2020    Fatigue 05/30/2019    Hypertension 05/30/2019     No orders of the defined types were placed in this encounter    Compatible tolerance to HBO

## 2020-09-18 ENCOUNTER — OFFICE VISIT (OUTPATIENT)
Dept: WOUND CARE | Facility: HOSPITAL | Age: 65
End: 2020-09-18
Payer: COMMERCIAL

## 2020-09-18 DIAGNOSIS — M27.2 INFLAMMATORY CONDITION OF JAW: ICD-10-CM

## 2020-09-18 PROCEDURE — 99183 HYPERBARIC OXYGEN THERAPY: CPT | Performed by: FAMILY MEDICINE

## 2020-09-18 PROCEDURE — G0277 HBOT, FULL BODY CHAMBER, 30M: HCPCS | Performed by: FAMILY MEDICINE

## 2020-09-18 NOTE — PROGRESS NOTES
HBO Treatment Course Details       Treatment Notes:  No problems with treatment  Treatment Course Number: 30  Total Treatments Ordered:  40     Diagnosis:   1  Inflammatory condition of jaw  [facility]       [facility] Treatment Details:  In-Patient Visit: no  Treatment Length:90 Minutes(Minutes)  Chamber #:      Pre-Treatment details:  Pre-treatment protocol Treatment Protocol: 2 5 ALIA X 90 minutes w/ 100% oxygen, two 5 minute air breaks  Left ear clear?: yes  Right ear clear?: yes  Left ear intact?: yes  Right ear intact?: yes  Left ear color: Gray  Right ear color: Dark gray              Left ear TEED scale: Grade I  Right ear TEED scale: Grade I   Pretreatment heart and lung assessment: Pretreatment heart and lung auscltation unremarkable  Patient cleared for HBOT     Treatment details:  ALIA Rate:    Started Compression: 1105  Reached Compression: 1120  Total Compression Time: 15 (Minutes)  Total Holding Time: 100 (Minutes)  Started Decompression: 1300  Reached Surface: 1120  Total Decompression Time: 10 (Minutes)  Total Airbreaks: 100 (Minutes)  Total Time of Treatment: 120 (Minutes)  Symptoms Noted During Treatment: None (Minutes)    Post treatment details:  Left ear clear?: yes  Right ear clear?: yes  Left ears intact?: yes  Right ears intact?: yes  Left ear color: Gray  Right ear color: Like gray              Left ear TEED scale: Grade I  Right ear TEED scale: Grade I  Post treatment heart and lung assessment: Post treatment heart and lung auscltation unremarkable  Patient cleared for discharge  Tolerated treatment well         Vital Signs:  HBO Glucose Reference Range: 100-350 mg/dl   Pre-Treatment Post-Treatment   Time vitals are taken: 1050 Time vitals are taken: 1311   Blood Pressure: 128/82 Blood Pressure: 130/84   Pulse: 68 Pulse: 68   Resp: 18 Resp: 16   Temp: 96 1 °F (35 6 °C) Temp: 96 6 °F (35 9 °C)             Allergies   Allergen Reactions    Penicillins Rash     Patient Active Problem List    Diagnosis Date Noted    Inflammatory condition of jaw 08/11/2020    Fatigue 05/30/2019    Hypertension 05/30/2019   Compatible tolerance to   HBO

## 2020-09-21 ENCOUNTER — OFFICE VISIT (OUTPATIENT)
Dept: WOUND CARE | Facility: HOSPITAL | Age: 65
End: 2020-09-21
Payer: COMMERCIAL

## 2020-09-21 DIAGNOSIS — M27.2 INFLAMMATORY CONDITION OF JAW: ICD-10-CM

## 2020-09-21 PROCEDURE — 99183 HYPERBARIC OXYGEN THERAPY: CPT | Performed by: PHYSICIAN ASSISTANT

## 2020-09-21 PROCEDURE — G0277 HBOT, FULL BODY CHAMBER, 30M: HCPCS

## 2020-09-21 NOTE — PROGRESS NOTES
HBO Treatment Course Details       Treatment Notes: Patient tolerated treatment well  No concerns or complaints pre and post treatment  All questions addressed  Treatment Course Number: 31  Total Treatments Ordered:  40     Diagnosis:   1  Inflammatory condition of jaw  [facility] oxygen [facility]       HBO Treatment Details:  In-Patient Visit: no  Treatment Length:90 Minutes(Minutes)  Chamber #:      Pre-Treatment details:  Pre-treatment protocol Treatment Protocol: 2 5 ALIA X 90 minutes w/ 100% oxygen, two 5 minute air breaks  Left ear clear?: yes  Right ear clear?: yes  Left ear intact?: yes  Right ear intact?: yes        PE Tubes present, Left ear?: no  PE Tubes present, Right ear?: no  Left ear irrigated?: no  Right ear irrigated?: no  Left ear TEED scale: Grade 0  Right ear TEED scale: Grade 0   Pretreatment heart and lung assessment: Pretreatment heart and lung auscltation unremarkable  Patient cleared for HBOT     Treatment details:  ALIA Rate: 2 5  Started Compression: 0825  Reached Compression: 0840  Total Compression Time: 15 (Minutes)  Total Holding Time: 100 (Minutes)  Started Decompression: 1020  Reached Surface: 0840  Total Decompression Time: 10 (Minutes)  Total Airbreaks: 100 (Minutes)  Total Time of Treatment: 120 (Minutes)  Symptoms Noted During Treatment: None (Minutes)    Post treatment details:  Left ear clear?: yes  Right ear clear?: yes  Left ears intact?: yes  Right ears intact?: yes        PE Tubes present, Left ear?: no  PE Tubes present, Right ear?: no        Left ear TEED scale: Grade 0  Right ear TEED scale: Grade 0  Post treatment heart and lung assessment: Post treatment heart and lung auscltation unremarkable  Patient cleared for discharge  Tolerated treatment well         Vital Signs:  HBO Glucose Reference Range: 100-350 mg/dl   Pre-Treatment Post-Treatment   Time vitals are taken: 0820 Time vitals are taken: 1031   Blood Pressure: 134/86 Blood Pressure: 142/82   Pulse: 72 Pulse: 76   Resp: 18 Resp: 16   Temp: 95 4 °F (35 2 °C) Temp: 96 1 °F (35 6 °C)             Allergies   Allergen Reactions    Penicillins Rash     Patient Active Problem List    Diagnosis Date Noted    Inflammatory condition of jaw 08/11/2020    Fatigue 05/30/2019    Hypertension 05/30/2019     No orders of the defined types were placed in this encounter    Compatible tolerance to HBO

## 2020-09-22 ENCOUNTER — OFFICE VISIT (OUTPATIENT)
Dept: WOUND CARE | Facility: HOSPITAL | Age: 65
End: 2020-09-22
Payer: COMMERCIAL

## 2020-09-22 DIAGNOSIS — M27.2 INFLAMMATORY CONDITION OF JAW: ICD-10-CM

## 2020-09-22 PROCEDURE — 99183 HYPERBARIC OXYGEN THERAPY: CPT | Performed by: PHYSICIAN ASSISTANT

## 2020-09-22 PROCEDURE — G0277 HBOT, FULL BODY CHAMBER, 30M: HCPCS

## 2020-09-22 NOTE — PROGRESS NOTES
HBO Treatment Course Details       Treatment Notes: Patient tolerated treatment without complication  All questions addressed  Treatment Course Number: 32  Total Treatments Ordered:  40     Diagnosis:   1  Inflammatory condition of jaw  Hyperbaric oxygen thearpy       HBO Treatment Details:  In-Patient Visit: no  Treatment Length:90 Minutes(Minutes)  Chamber #:      Pre-Treatment details:  Pre-treatment protocol Treatment Protocol: 2 5 ALIA X 90 minutes w/ 100% oxygen, two 5 minute air breaks  Left ear clear?: yes  Right ear clear?: yes  Left ear intact?: yes  Right ear intact?: yes        PE Tubes present, Left ear?: no  PE Tubes present, Right ear?: no  Left ear irrigated?: no  Right ear irrigated?: no  Left ear TEED scale: Grade 0  Right ear TEED scale: Grade 0   Pretreatment heart and lung assessment: Pretreatment heart and lung auscltation unremarkable  Patient cleared for HBOT     Treatment details:  ALIA Rate: 2 5  Started Compression: 1050  Reached Compression: 1105  Total Compression Time: 15 (Minutes)  Total Holding Time: 100 (Minutes)  Started Decompression: 1245  Reached Surface: 9138  Total Decompression Time: 10 (Minutes)  Total Airbreaks: 100 (Minutes)  Total Time of Treatment: 120 (Minutes)  Symptoms Noted During Treatment: None (Minutes)    Post treatment details:  Left ear clear?: yes  Right ear clear?: yes  Left ears intact?: yes  Right ears intact?: yes        PE Tubes present, Left ear?: no  PE Tubes present, Right ear?: no        Left ear TEED scale: Grade 0  Right ear TEED scale: Grade 0  Post treatment heart and lung assessment: Post treatment heart and lung auscltation unremarkable  Patient cleared for discharge  Tolerated treatment well         Vital Signs:  HBO Glucose Reference Range: 100-350 mg/dl   Pre-Treatment Post-Treatment   Time vitals are taken: 1045 Time vitals are taken: 1256   Blood Pressure: 126/84 Blood Pressure: 138/88   Pulse: 72 Pulse: 72   Resp: 18 Resp: 16   Temp: 96 3 °F (35 7 °C) Temp: 95 4 °F (35 2 °C)             Allergies   Allergen Reactions    Penicillins Rash     Patient Active Problem List    Diagnosis Date Noted    Inflammatory condition of jaw 08/11/2020    Fatigue 05/30/2019    Hypertension 05/30/2019     No orders of the defined types were placed in this encounter    Compatible tolerance to HBO

## 2020-09-23 ENCOUNTER — OFFICE VISIT (OUTPATIENT)
Dept: WOUND CARE | Facility: HOSPITAL | Age: 65
End: 2020-09-23
Payer: COMMERCIAL

## 2020-09-23 DIAGNOSIS — M27.2 INFLAMMATORY CONDITION OF JAW: ICD-10-CM

## 2020-09-23 PROCEDURE — 99183 HYPERBARIC OXYGEN THERAPY: CPT | Performed by: FAMILY MEDICINE

## 2020-09-23 PROCEDURE — G0277 HBOT, FULL BODY CHAMBER, 30M: HCPCS | Performed by: FAMILY MEDICINE

## 2020-09-23 NOTE — PROGRESS NOTES
HBO Treatment Course Details       Treatment Notes:  Uneventful HBO treatment  Treatment Course Number: 33  Total Treatments Ordered:  40     Diagnosis:   1  Inflammatory condition of jaw  Hyperbaric oxygen thearpy       HBO Treatment Details:  In-Patient Visit: no  Treatment Length:90 Minutes(Minutes)  Chamber #:      Pre-Treatment details:  Pre-treatment protocol Treatment Protocol: 2 5 ALIA X 90 minutes w/ 100% oxygen, two 5 minute air breaks  Left ear clear?: yes  Right ear clear?: yes  Left ear intact?: yes  Right ear intact?: yes                    Left ear TEED scale: Grade 0  Right ear TEED scale: Grade 0   Pretreatment heart and lung assessment: Pretreatment heart and lung auscltation unremarkable  Patient cleared for HBOT     Treatment details:  ALIA Rate: 2 5  Started Compression: 1042  Reached Compression: 1057  Total Compression Time: 15 (Minutes)  Total Holding Time: 100 (Minutes)  Started Decompression: 1237  Reached Surface: 1057  Total Decompression Time: 10 (Minutes)  Total Airbreaks: 100 (Minutes)  Total Time of Treatment: 120 (Minutes)  Symptoms Noted During Treatment: None (Minutes)    Post treatment details:  Left ear clear?: yes  Right ear clear?: yes  Left ears intact?: yes  Right ears intact?: yes  Left ear color: gray  Right ear color: lite gray              Left ear TEED scale: Grade I  Right ear TEED scale: Grade I  Post treatment heart and lung assessment: Post treatment heart and lung auscltation unremarkable  Patient cleared for discharge  Tolerated treatment well         Vital Signs:  HBO Glucose Reference Range: 100-350 mg/dl   Pre-Treatment Post-Treatment   Time vitals are taken: 1030 Time vitals are taken: 1148   Blood Pressure: 122/76 Blood Pressure: 130/88   Pulse: 72 Pulse: 64   Resp: 18 Resp: 16   Temp: 97 4 °F (36 3 °C) Temp: 95 5 °F (35 3 °C)             Allergies   Allergen Reactions    Penicillins Rash     Patient Active Problem List    Diagnosis Date Noted    Inflammatory condition of jaw 08/11/2020    Fatigue 05/30/2019    Hypertension 05/30/2019     No orders of the defined types were placed in this encounter

## 2020-09-25 ENCOUNTER — OFFICE VISIT (OUTPATIENT)
Dept: WOUND CARE | Facility: HOSPITAL | Age: 65
End: 2020-09-25
Payer: COMMERCIAL

## 2020-09-25 DIAGNOSIS — M27.2 INFLAMMATORY CONDITION OF JAW: ICD-10-CM

## 2020-09-25 PROCEDURE — 99183 HYPERBARIC OXYGEN THERAPY: CPT | Performed by: FAMILY MEDICINE

## 2020-09-25 PROCEDURE — G0277 HBOT, FULL BODY CHAMBER, 30M: HCPCS | Performed by: FAMILY MEDICINE

## 2020-09-25 NOTE — PROGRESS NOTES
HBO Treatment Course Details       Treatment Notes:  Uneventful HBO  Treatment Course Number: 34  Total Treatments Ordered:  40     Diagnosis:   1  Inflammatory condition of jaw  Hyperbaric oxygen thearpy       HBO Treatment Details:  In-Patient Visit: no  Treatment Length:90 Minutes(Minutes)  Chamber #:      Pre-Treatment details:  Pre-treatment protocol Treatment Protocol: 2 5 ALIA X 90 minutes w/ 100% oxygen, two 5 minute air breaks  Left ear clear?: yes  Right ear clear?: yes  Left ear intact?: yes  Right ear intact?: yes  Left ear color: gray  Right ear color: gray              Left ear TEED scale: Grade I  Right ear TEED scale: Grade I   Pretreatment heart and lung assessment: Pretreatment heart and lung auscltation unremarkable  Patient cleared for HBOT     Treatment details:  ALIA Rate: 2 5  Started Compression: 0840  Reached Compression: 0855  Total Compression Time: 15 (Minutes)  Total Holding Time: 100 (Minutes)  Started Decompression: 1035  Reached Surface: 9646  Total Decompression Time: 10 (Minutes)  Total Airbreaks: 100 (Minutes)  Total Time of Treatment: 120 (Minutes)  Symptoms Noted During Treatment: None (Minutes)    Post treatment details:  Left ear clear?: yes  Right ear clear?: yes  Left ears intact?: yes  Right ears intact?: yes  Left ear color: gray  Right ear color: gray              Left ear TEED scale: Grade I  Right ear TEED scale: Grade I  Post treatment heart and lung assessment: Post treatment heart and lung auscltation unremarkable  Patient cleared for discharge  Tolerated treatment well         Vital Signs:  HBO Glucose Reference Range: 100-350 mg/dl   Pre-Treatment Post-Treatment   Time vitals are taken: 0835 Time vitals are taken: 1047   Blood Pressure: 124/78 Blood Pressure: 124/80   Pulse: 100 Pulse: 72   Resp: 20 Resp: 16   Temp: 95 8 °F (35 4 °C) Temp: 95 5 °F (35 3 °C)             Allergies   Allergen Reactions    Penicillins Rash     Patient Active Problem List    Diagnosis Date Noted    Inflammatory condition of jaw 08/11/2020    Fatigue 05/30/2019    Hypertension 05/30/2019     No orders of the defined types were placed in this encounter
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
## 2020-09-29 ENCOUNTER — OFFICE VISIT (OUTPATIENT)
Dept: WOUND CARE | Facility: HOSPITAL | Age: 65
End: 2020-09-29
Payer: COMMERCIAL

## 2020-09-29 DIAGNOSIS — M27.2 INFLAMMATORY CONDITION OF JAW: ICD-10-CM

## 2020-09-29 PROCEDURE — G0277 HBOT, FULL BODY CHAMBER, 30M: HCPCS | Performed by: PHYSICIAN ASSISTANT

## 2020-09-29 PROCEDURE — 99183 HYPERBARIC OXYGEN THERAPY: CPT | Performed by: PHYSICIAN ASSISTANT

## 2020-09-30 ENCOUNTER — OFFICE VISIT (OUTPATIENT)
Dept: WOUND CARE | Facility: HOSPITAL | Age: 65
End: 2020-09-30
Payer: COMMERCIAL

## 2020-09-30 DIAGNOSIS — M27.2 INFLAMMATORY CONDITION OF JAW: ICD-10-CM

## 2020-09-30 PROCEDURE — 99183 HYPERBARIC OXYGEN THERAPY: CPT | Performed by: FAMILY MEDICINE

## 2020-09-30 PROCEDURE — G0277 HBOT, FULL BODY CHAMBER, 30M: HCPCS | Performed by: FAMILY MEDICINE

## 2020-09-30 NOTE — PROGRESS NOTES
HBO Treatment Course Details       Treatment Notes:  Uneventful HBO treatment     Treatment Course Number: 36  Total Treatments Ordered:  40     Diagnosis:   1  Inflammatory condition of jaw  Hyperbaric oxygen thearpy       HBO Treatment Details:  In-Patient Visit: no  Treatment Length:90 Minutes(Minutes)  Chamber #:      Pre-Treatment details:  Pre-treatment protocol Treatment Protocol: 2 5 ALIA X 90 minutes w/ 100% oxygen, two 5 minute air breaks  Left ear clear?: yes  Right ear clear?: yes  Left ear intact?: yes  Right ear intact?: yes  Left ear color: gray  Right ear color: gray              Left ear TEED scale: Grade I  Right ear TEED scale: Grade I   Pretreatment heart and lung assessment: Pretreatment heart and lung auscltation unremarkable  Patient cleared for HBOT     Treatment details:  ALIA Rate: 2 5  Started Compression: 1110  Reached Compression: 1125  Total Compression Time: 15 (Minutes)  Total Holding Time: 100 (Minutes)  Started Decompression: 1305  Reached Surface: 1125  Total Decompression Time: 10 (Minutes)  Total Airbreaks: 100 (Minutes)  Total Time of Treatment: 120 (Minutes)  Symptoms Noted During Treatment: None (Minutes)    Post treatment details:  Left ear clear?: yes  Right ear clear?: yes  Left ears intact?: yes  Right ears intact?: yes  Left ear color: gray  Right ear color: gray              Left ear TEED scale: Grade I  Right ear TEED scale: Grade I  Post treatment heart and lung assessment: Post treatment heart and lung auscltation unremarkable  Patient cleared for discharge  Tolerated treatment well         Vital Signs:  HBO Glucose Reference Range: 100-350 mg/dl   Pre-Treatment Post-Treatment   Time vitals are taken: 1055 Time vitals are taken: 1330   Blood Pressure: 132/90 Blood Pressure: (!) 160/96   Pulse: 68 Pulse: 72   Resp: 18 Resp: 16   Temp: 95 6 °F (35 3 °C) Temp: 97 2 °F (36 2 °C)             Allergies   Allergen Reactions    Penicillins Rash     Patient Active Problem List Diagnosis Date Noted    Inflammatory condition of jaw 08/11/2020    Fatigue 05/30/2019    Hypertension 05/30/2019     No orders of the defined types were placed in this encounter    Compatible tolerance to HBO

## 2020-10-01 ENCOUNTER — OFFICE VISIT (OUTPATIENT)
Dept: WOUND CARE | Facility: HOSPITAL | Age: 65
End: 2020-10-01
Payer: COMMERCIAL

## 2020-10-01 DIAGNOSIS — M27.2 INFLAMMATORY CONDITION OF JAW: ICD-10-CM

## 2020-10-01 PROCEDURE — G0277 HBOT, FULL BODY CHAMBER, 30M: HCPCS | Performed by: FAMILY MEDICINE

## 2020-10-01 PROCEDURE — 99183 HYPERBARIC OXYGEN THERAPY: CPT | Performed by: FAMILY MEDICINE

## 2020-10-02 ENCOUNTER — OFFICE VISIT (OUTPATIENT)
Dept: WOUND CARE | Facility: HOSPITAL | Age: 65
End: 2020-10-02
Payer: COMMERCIAL

## 2020-10-02 DIAGNOSIS — M27.2 INFLAMMATORY CONDITION OF JAW: Primary | ICD-10-CM

## 2020-10-02 PROCEDURE — 99183 HYPERBARIC OXYGEN THERAPY: CPT | Performed by: FAMILY MEDICINE

## 2020-10-02 PROCEDURE — G0277 HBOT, FULL BODY CHAMBER, 30M: HCPCS | Performed by: FAMILY MEDICINE

## 2020-10-05 ENCOUNTER — OFFICE VISIT (OUTPATIENT)
Dept: WOUND CARE | Facility: HOSPITAL | Age: 65
End: 2020-10-05
Payer: COMMERCIAL

## 2020-10-05 DIAGNOSIS — M27.2 INFLAMMATORY CONDITION OF JAW: ICD-10-CM

## 2020-10-05 PROCEDURE — 99183 HYPERBARIC OXYGEN THERAPY: CPT | Performed by: SURGERY

## 2020-10-05 PROCEDURE — G0277 HBOT, FULL BODY CHAMBER, 30M: HCPCS

## 2021-08-13 ENCOUNTER — TELEPHONE (OUTPATIENT)
Dept: UROLOGY | Facility: CLINIC | Age: 66
End: 2021-08-13
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
## 2021-08-13 NOTE — TELEPHONE ENCOUNTER
Call center v/m was left from Emilee Lux requesting records be sent for second opinion to [facility]  fax to [phone] Attn: Duncan Figueroa   Pt of Dr Rodger Gonzalez and Dr Emory Schumacher